# Patient Record
Sex: MALE | Race: WHITE | NOT HISPANIC OR LATINO
[De-identification: names, ages, dates, MRNs, and addresses within clinical notes are randomized per-mention and may not be internally consistent; named-entity substitution may affect disease eponyms.]

---

## 2017-08-01 ENCOUNTER — APPOINTMENT (OUTPATIENT)
Dept: SURGERY | Facility: CLINIC | Age: 82
End: 2017-08-01
Payer: MEDICARE

## 2017-08-01 VITALS
HEART RATE: 63 BPM | SYSTOLIC BLOOD PRESSURE: 140 MMHG | BODY MASS INDEX: 32.96 KG/M2 | WEIGHT: 210 LBS | DIASTOLIC BLOOD PRESSURE: 83 MMHG | HEIGHT: 67 IN | TEMPERATURE: 98.2 F

## 2017-08-01 DIAGNOSIS — Z87.19 PERSONAL HISTORY OF OTHER DISEASES OF THE DIGESTIVE SYSTEM: ICD-10-CM

## 2017-08-01 DIAGNOSIS — K80.10 CALCULUS OF GALLBLADDER WITH CHRONIC CHOLECYSTITIS W/OUT OBSTRUCTION: ICD-10-CM

## 2017-08-01 DIAGNOSIS — K43.2 INCISIONAL HERNIA W/OUT OBSTRUCTION OR GANGRENE: ICD-10-CM

## 2017-08-01 PROCEDURE — 99213 OFFICE O/P EST LOW 20 MIN: CPT

## 2018-07-10 ENCOUNTER — APPOINTMENT (OUTPATIENT)
Dept: SURGERY | Facility: CLINIC | Age: 83
End: 2018-07-10
Payer: MEDICARE

## 2018-07-10 VITALS
WEIGHT: 210 LBS | DIASTOLIC BLOOD PRESSURE: 78 MMHG | HEART RATE: 86 BPM | HEIGHT: 67 IN | SYSTOLIC BLOOD PRESSURE: 120 MMHG | BODY MASS INDEX: 32.96 KG/M2

## 2018-07-10 DIAGNOSIS — R10.31 RIGHT LOWER QUADRANT PAIN: ICD-10-CM

## 2018-07-10 PROCEDURE — 99214 OFFICE O/P EST MOD 30 MIN: CPT

## 2018-07-10 RX ORDER — TOBRAMYCIN AND DEXAMETHASONE 3; 1 MG/ML; MG/ML
0.3-0.1 SUSPENSION/ DROPS OPHTHALMIC
Qty: 5 | Refills: 0 | Status: COMPLETED | COMMUNITY
Start: 2018-03-09

## 2019-11-18 ENCOUNTER — INPATIENT (INPATIENT)
Facility: HOSPITAL | Age: 84
LOS: 5 days | Discharge: HOME | DRG: 85 | End: 2019-11-24
Attending: SURGERY | Admitting: SURGERY
Payer: MEDICARE

## 2019-11-18 ENCOUNTER — EMERGENCY (EMERGENCY)
Facility: HOSPITAL | Age: 84
LOS: 1 days | Discharge: ANOTHER HOSPITAL | End: 2019-11-18
Attending: EMERGENCY MEDICINE | Admitting: EMERGENCY MEDICINE
Payer: MEDICARE

## 2019-11-18 VITALS
SYSTOLIC BLOOD PRESSURE: 159 MMHG | RESPIRATION RATE: 16 BRPM | HEIGHT: 66 IN | WEIGHT: 210.1 LBS | DIASTOLIC BLOOD PRESSURE: 98 MMHG | HEART RATE: 97 BPM | TEMPERATURE: 98 F | OXYGEN SATURATION: 92 %

## 2019-11-18 VITALS
TEMPERATURE: 99 F | OXYGEN SATURATION: 98 % | HEART RATE: 89 BPM | SYSTOLIC BLOOD PRESSURE: 150 MMHG | RESPIRATION RATE: 18 BRPM | DIASTOLIC BLOOD PRESSURE: 70 MMHG

## 2019-11-18 LAB
ALBUMIN SERPL ELPH-MCNC: 3.5 G/DL — SIGNIFICANT CHANGE UP (ref 3.3–5)
ALP SERPL-CCNC: 64 U/L — SIGNIFICANT CHANGE UP (ref 40–120)
ALT FLD-CCNC: 12 U/L — SIGNIFICANT CHANGE UP (ref 12–78)
ANION GAP SERPL CALC-SCNC: 5 MMOL/L — SIGNIFICANT CHANGE UP (ref 5–17)
APTT BLD: 26.9 SEC — LOW (ref 28.5–37)
AST SERPL-CCNC: 18 U/L — SIGNIFICANT CHANGE UP (ref 15–37)
BILIRUB SERPL-MCNC: 0.5 MG/DL — SIGNIFICANT CHANGE UP (ref 0.2–1.2)
BUN SERPL-MCNC: 19 MG/DL — SIGNIFICANT CHANGE UP (ref 7–23)
CALCIUM SERPL-MCNC: 8.6 MG/DL — SIGNIFICANT CHANGE UP (ref 8.5–10.1)
CHLORIDE SERPL-SCNC: 109 MMOL/L — HIGH (ref 96–108)
CK SERPL-CCNC: 97 U/L — SIGNIFICANT CHANGE UP (ref 26–308)
CO2 SERPL-SCNC: 29 MMOL/L — SIGNIFICANT CHANGE UP (ref 22–31)
CREAT SERPL-MCNC: 1.1 MG/DL — SIGNIFICANT CHANGE UP (ref 0.5–1.3)
GLUCOSE SERPL-MCNC: 115 MG/DL — HIGH (ref 70–99)
HCT VFR BLD CALC: 42.3 % — SIGNIFICANT CHANGE UP (ref 39–50)
HGB BLD-MCNC: 13.7 G/DL — SIGNIFICANT CHANGE UP (ref 13–17)
INR BLD: 1.2 RATIO — HIGH (ref 0.88–1.16)
MCHC RBC-ENTMCNC: 28.4 PG — SIGNIFICANT CHANGE UP (ref 27–34)
MCHC RBC-ENTMCNC: 32.4 GM/DL — SIGNIFICANT CHANGE UP (ref 32–36)
MCV RBC AUTO: 87.6 FL — SIGNIFICANT CHANGE UP (ref 80–100)
NRBC # BLD: 0 /100 WBCS — SIGNIFICANT CHANGE UP (ref 0–0)
PLATELET # BLD AUTO: 100 K/UL — LOW (ref 150–400)
POTASSIUM SERPL-MCNC: 4.2 MMOL/L — SIGNIFICANT CHANGE UP (ref 3.5–5.3)
POTASSIUM SERPL-SCNC: 4.2 MMOL/L — SIGNIFICANT CHANGE UP (ref 3.5–5.3)
PROT SERPL-MCNC: 6.7 G/DL — SIGNIFICANT CHANGE UP (ref 6–8.3)
PROTHROM AB SERPL-ACNC: 13.6 SEC — HIGH (ref 10–12.9)
RBC # BLD: 4.83 M/UL — SIGNIFICANT CHANGE UP (ref 4.2–5.8)
RBC # FLD: 14.2 % — SIGNIFICANT CHANGE UP (ref 10.3–14.5)
SODIUM SERPL-SCNC: 143 MMOL/L — SIGNIFICANT CHANGE UP (ref 135–145)
TROPONIN I SERPL-MCNC: 0.8 NG/ML — HIGH (ref 0.01–0.04)
WBC # BLD: 5.82 K/UL — SIGNIFICANT CHANGE UP (ref 3.8–10.5)
WBC # FLD AUTO: 5.82 K/UL — SIGNIFICANT CHANGE UP (ref 3.8–10.5)

## 2019-11-18 PROCEDURE — 70450 CT HEAD/BRAIN W/O DYE: CPT | Mod: 26

## 2019-11-18 PROCEDURE — 99285 EMERGENCY DEPT VISIT HI MDM: CPT | Mod: 25

## 2019-11-18 PROCEDURE — 93010 ELECTROCARDIOGRAM REPORT: CPT

## 2019-11-18 PROCEDURE — 71250 CT THORAX DX C-: CPT | Mod: 26

## 2019-11-18 PROCEDURE — 86077 PHYS BLOOD BANK SERV XMATCH: CPT

## 2019-11-18 PROCEDURE — 70486 CT MAXILLOFACIAL W/O DYE: CPT | Mod: 26

## 2019-11-18 PROCEDURE — 72125 CT NECK SPINE W/O DYE: CPT | Mod: 26

## 2019-11-18 PROCEDURE — 99291 CRITICAL CARE FIRST HOUR: CPT

## 2019-11-18 RX ORDER — ONDANSETRON 8 MG/1
4 TABLET, FILM COATED ORAL ONCE
Refills: 0 | Status: COMPLETED | OUTPATIENT
Start: 2019-11-18 | End: 2019-11-18

## 2019-11-18 RX ORDER — HYDROMORPHONE HYDROCHLORIDE 2 MG/ML
0.5 INJECTION INTRAMUSCULAR; INTRAVENOUS; SUBCUTANEOUS ONCE
Refills: 0 | Status: DISCONTINUED | OUTPATIENT
Start: 2019-11-18 | End: 2019-11-18

## 2019-11-18 RX ORDER — MORPHINE SULFATE 50 MG/1
4 CAPSULE, EXTENDED RELEASE ORAL ONCE
Refills: 0 | Status: DISCONTINUED | OUTPATIENT
Start: 2019-11-18 | End: 2019-11-18

## 2019-11-18 RX ORDER — TETANUS TOXOID, REDUCED DIPHTHERIA TOXOID AND ACELLULAR PERTUSSIS VACCINE, ADSORBED 5; 2.5; 8; 8; 2.5 [IU]/.5ML; [IU]/.5ML; UG/.5ML; UG/.5ML; UG/.5ML
0.5 SUSPENSION INTRAMUSCULAR ONCE
Refills: 0 | Status: COMPLETED | OUTPATIENT
Start: 2019-11-18 | End: 2019-11-18

## 2019-11-18 RX ORDER — SODIUM CHLORIDE 9 MG/ML
3 INJECTION INTRAMUSCULAR; INTRAVENOUS; SUBCUTANEOUS ONCE
Refills: 0 | Status: COMPLETED | OUTPATIENT
Start: 2019-11-18 | End: 2019-11-18

## 2019-11-18 RX ADMIN — TETANUS TOXOID, REDUCED DIPHTHERIA TOXOID AND ACELLULAR PERTUSSIS VACCINE, ADSORBED 0.5 MILLILITER(S): 5; 2.5; 8; 8; 2.5 SUSPENSION INTRAMUSCULAR at 20:11

## 2019-11-18 RX ADMIN — ONDANSETRON 4 MILLIGRAM(S): 8 TABLET, FILM COATED ORAL at 20:10

## 2019-11-18 RX ADMIN — MORPHINE SULFATE 4 MILLIGRAM(S): 50 CAPSULE, EXTENDED RELEASE ORAL at 20:09

## 2019-11-18 RX ADMIN — MORPHINE SULFATE 4 MILLIGRAM(S): 50 CAPSULE, EXTENDED RELEASE ORAL at 21:35

## 2019-11-18 RX ADMIN — SODIUM CHLORIDE 3 MILLILITER(S): 9 INJECTION INTRAMUSCULAR; INTRAVENOUS; SUBCUTANEOUS at 20:06

## 2019-11-18 RX ADMIN — HYDROMORPHONE HYDROCHLORIDE 0.5 MILLIGRAM(S): 2 INJECTION INTRAMUSCULAR; INTRAVENOUS; SUBCUTANEOUS at 22:12

## 2019-11-18 NOTE — ED ADULT NURSE NOTE - OBJECTIVE STATEMENT
Pt comes from triage. Pt reports that he tripped on a curb outside and hit his head and right side of chest. Pt has dried blood to right forehead and right sided breast pain. Pt placed on telemetry monitor. Pt breathing easy, unlabored, no signs of distress, reports it hurts on the right side to take a deep breath. Pt denies LOC, denies blood thinners. Pt does not have any bruising noted to right chest. Pt reports he was lying on the ground for an estimated 30 minutes before a car stopped and a bystander helped him up.

## 2019-11-18 NOTE — ED PROVIDER NOTE - OBJECTIVE STATEMENT
Pt is a 87 yo male who presents to the ED with a cc of fall.  PMHx of HTN.  Pt reports that today while walking outside to get his mail he tripped on the curb, lost his balance, and fell to the ground striking the right side of his face and body.  Denies LOC.  Pt is on ASA but denies use of other blood thinners.  Pt report that he was experiencing right sided facial pain and right upper anterior chest pain.  Pain is worsened with movement, touch, and deep breathing.  He reports that he laid on the ground for approx 20 min and was unable to get up on his own.  He was finally helped up by someone driving by.  He reports continued chest pain and so he came to the ED for further work up.  Pt did not take anything for the pain.  Unsure of date of last Tetanus.  Denies HA, N/V, SOB, abd pain.  Denies neck or back pain.  Denies numbness or tingling in his ext, denies loss of bowel or bladder function. Pt is a 85 yo male who presents to the ED with a cc of fall.  PMHx of HTN.  Pt reports that today while walking outside to get his mail he tripped on the curb, lost his balance, and fell to the ground striking the right side of his face and body.  Denies LOC.  Pt is on ASA but denies use of other blood thinners.  Pt report that he was experiencing right sided facial pain and right upper anterior chest pain.  Pain is worsened with movement, touch, and deep breathing.  He reports that he laid on the ground for approx 20 min and was unable to get up on his own.  He was finally helped up by someone driving by.  He reports continued chest pain and so he came to the ED for further work up.  Pt did not take anything for the pain.  Unsure of date of last Tetanus.  Denies HA, visual changes, N/V, SOB, abd pain.  Denies neck or back pain.  Denies numbness or tingling in his ext, denies loss of bowel or bladder function.

## 2019-11-18 NOTE — ED PROVIDER NOTE - PROGRESS NOTE DETAILS
spoke with Dr. [place] regarding results of CT head/facial, chest.  Also discussed elevated troponin.  Will accept pt in transfer to the ED at [facility].  Requests no platelet transfusion at this time

## 2019-11-18 NOTE — ED PROVIDER NOTE - CARE PLAN
Principal Discharge DX:	Intraparenchymal hematoma of brain  Secondary Diagnosis:	Subdural hematoma  Secondary Diagnosis:	Rib contusion, right, initial encounter  Secondary Diagnosis:	Abrasions of multiple sites  Secondary Diagnosis:	Elevated troponin

## 2019-11-18 NOTE — ED ADULT NURSE NOTE - NSIMPLEMENTINTERV_GEN_ALL_ED
Implemented All Fall Risk Interventions:  [place] to call system. Call bell, personal items and telephone within reach. Instruct patient to call for assistance. Room bathroom lighting operational. Non-slip footwear when patient is off stretcher. Physically safe environment: no spills, clutter or unnecessary equipment. Stretcher in lowest position, wheels locked, appropriate side rails in place. Provide visual cue, wrist band, yellow gown, etc. Monitor gait and stability. Monitor for mental status changes and reorient to person, place, and time. Review medications for side effects contributing to fall risk. Reinforce activity limits and safety measures with patient and family.

## 2019-11-18 NOTE — ED ADULT NURSE REASSESSMENT NOTE - NS ED NURSE REASSESS COMMENT FT1
Ambulance arrives for pt, report given to EMS. All paperwork and belongings with EMS for transfer. Pt transferred to EMS stretcher without incident. Report given to [facility] RN Nataly.

## 2019-11-18 NOTE — ED PROVIDER NOTE - CLINICAL SUMMARY MEDICAL DECISION MAKING FREE TEXT BOX
Pt is a 87 yo male who presents to the ED with a cc of fall.  PMHx of HTN.  Pt reports that today while walking outside to get his mail he tripped on the curb, lost his balance, and fell to the ground striking the right side of his face and body.  Denies LOC.  Pt is on ASA but denies use of other blood thinners.  Pt report that he was experiencing right sided facial pain and right upper anterior chest pain.  Pain is worsened with movement, touch, and deep breathing.  He reports that he laid on the ground for approx 20 min and was unable to get up on his own.  He was finally helped up by someone driving by.  He reports continued chest pain and so he came to the ED for further work up.  Pt did not take anything for the pain.  Unsure of date of last Tetanus.  Denies HA, visual changes, N/V, SOB, abd pain.  Denies neck or back pain.  Denies numbness or tingling in his ext, denies loss of bowel or bladder function. Pt is a 85 yo male who presents to the ED with a cc of fall.  PMHx of HTN.  Pt reports that today while walking outside to get his mail he tripped on the curb, lost his balance, and fell to the ground striking the right side of his face and body.  Denies LOC.  Pt is on ASA but denies use of other blood thinners.  Pt report that he was experiencing right sided facial pain and right upper anterior chest pain.  Pain is worsened with movement, touch, and deep breathing.  He reports that he laid on the ground for approx 20 min and was unable to get up on his own.  He was finally helped up by someone driving by.  He reports continued chest pain and so he came to the ED for further work up.  Pt did not take anything for the pain.  Unsure of date of last Tetanus.  Denies HA, visual changes, N/V, SOB, abd pain.  Denies neck or back pain.  Denies numbness or tingling in his ext, denies loss of bowel or bladder function.  Concern for rib fracture vs ICH.  Will obtain CT head/neck/facial bones, CT chest.  Will obtain labs, medical for pain and update Tetanus.

## 2019-11-18 NOTE — ED PROVIDER NOTE - SECONDARY DIAGNOSIS.
Subdural hematoma Rib contusion, right, initial encounter Abrasions of multiple sites Elevated troponin

## 2019-11-18 NOTE — ED PROVIDER NOTE - PHYSICAL EXAMINATION
TTP to right superior and inferior orbital region TTP to right superior and inferior orbital region with overlying lateral abrasion   TTP to right maxillary region with mild swelling   EOMI with no pain on eye movement   TMs clear no septal hematoma   TTP to right superior/anterior chest wall with no overlying contusion   no midline C/T/L TTP no acute step offs or deformities noted   Bilateral abrasions noted to knees with contusion also noted to right knee FROM with no eileen TTP, no significant ligament instability noted   No TTP to bilateral pelvic region

## 2019-11-18 NOTE — ED ADULT TRIAGE NOTE - CHIEF COMPLAINT QUOTE
"I just fell and hit my head. I was outside on the ground for 20 minutes before somebody found me. My head really hurts and my chest is killing me"

## 2019-11-19 VITALS
RESPIRATION RATE: 18 BRPM | OXYGEN SATURATION: 98 % | DIASTOLIC BLOOD PRESSURE: 70 MMHG | SYSTOLIC BLOOD PRESSURE: 148 MMHG | TEMPERATURE: 98 F | HEART RATE: 81 BPM

## 2019-11-19 DIAGNOSIS — S06.5X9A TRAUMATIC SUBDURAL HEMORRHAGE WITH LOSS OF CONSCIOUSNESS OF UNSPECIFIED DURATION, INITIAL ENCOUNTER: ICD-10-CM

## 2019-11-19 LAB
ALBUMIN SERPL ELPH-MCNC: 3.7 G/DL — SIGNIFICANT CHANGE UP (ref 3.3–5.2)
ALP SERPL-CCNC: 90 U/L — SIGNIFICANT CHANGE UP (ref 40–120)
ALT FLD-CCNC: 15 U/L — SIGNIFICANT CHANGE UP
ANION GAP SERPL CALC-SCNC: 12 MMOL/L — SIGNIFICANT CHANGE UP (ref 5–17)
ANION GAP SERPL CALC-SCNC: 12 MMOL/L — SIGNIFICANT CHANGE UP (ref 5–17)
APTT BLD: 23.8 SEC — LOW (ref 27.5–36.3)
AST SERPL-CCNC: 47 U/L — HIGH
BASOPHILS # BLD AUTO: 0.02 K/UL — SIGNIFICANT CHANGE UP (ref 0–0.2)
BASOPHILS NFR BLD AUTO: 0.3 % — SIGNIFICANT CHANGE UP (ref 0–2)
BILIRUB SERPL-MCNC: 0.8 MG/DL — SIGNIFICANT CHANGE UP (ref 0.4–2)
BUN SERPL-MCNC: 17 MG/DL — SIGNIFICANT CHANGE UP (ref 8–20)
BUN SERPL-MCNC: 19 MG/DL — SIGNIFICANT CHANGE UP (ref 8–20)
CALCIUM SERPL-MCNC: 8.5 MG/DL — LOW (ref 8.6–10.2)
CALCIUM SERPL-MCNC: 8.8 MG/DL — SIGNIFICANT CHANGE UP (ref 8.6–10.2)
CHLORIDE SERPL-SCNC: 102 MMOL/L — SIGNIFICANT CHANGE UP (ref 98–107)
CHLORIDE SERPL-SCNC: 104 MMOL/L — SIGNIFICANT CHANGE UP (ref 98–107)
CK MB CFR SERPL CALC: 12.6 NG/ML — HIGH (ref 0–6.7)
CK SERPL-CCNC: 172 U/L — SIGNIFICANT CHANGE UP (ref 30–200)
CO2 SERPL-SCNC: 26 MMOL/L — SIGNIFICANT CHANGE UP (ref 22–29)
CO2 SERPL-SCNC: 27 MMOL/L — SIGNIFICANT CHANGE UP (ref 22–29)
CREAT SERPL-MCNC: 0.81 MG/DL — SIGNIFICANT CHANGE UP (ref 0.5–1.3)
CREAT SERPL-MCNC: 0.91 MG/DL — SIGNIFICANT CHANGE UP (ref 0.5–1.3)
EOSINOPHIL # BLD AUTO: 0.01 K/UL — SIGNIFICANT CHANGE UP (ref 0–0.5)
EOSINOPHIL NFR BLD AUTO: 0.2 % — SIGNIFICANT CHANGE UP (ref 0–6)
GLUCOSE SERPL-MCNC: 121 MG/DL — HIGH (ref 70–115)
GLUCOSE SERPL-MCNC: 128 MG/DL — HIGH (ref 70–115)
HCT VFR BLD CALC: 39.5 % — SIGNIFICANT CHANGE UP (ref 39–50)
HCT VFR BLD CALC: 40.9 % — SIGNIFICANT CHANGE UP (ref 39–50)
HGB BLD-MCNC: 12.5 G/DL — LOW (ref 13–17)
HGB BLD-MCNC: 13.4 G/DL — SIGNIFICANT CHANGE UP (ref 13–17)
IMM GRANULOCYTES NFR BLD AUTO: 0.5 % — SIGNIFICANT CHANGE UP (ref 0–1.5)
INR BLD: 1.19 RATIO — HIGH (ref 0.88–1.16)
LYMPHOCYTES # BLD AUTO: 0.98 K/UL — LOW (ref 1–3.3)
LYMPHOCYTES # BLD AUTO: 16.5 % — SIGNIFICANT CHANGE UP (ref 13–44)
MAGNESIUM SERPL-MCNC: 1.8 MG/DL — SIGNIFICANT CHANGE UP (ref 1.6–2.6)
MCHC RBC-ENTMCNC: 28.4 PG — SIGNIFICANT CHANGE UP (ref 27–34)
MCHC RBC-ENTMCNC: 29.3 PG — SIGNIFICANT CHANGE UP (ref 27–34)
MCHC RBC-ENTMCNC: 31.6 GM/DL — LOW (ref 32–36)
MCHC RBC-ENTMCNC: 32.8 GM/DL — SIGNIFICANT CHANGE UP (ref 32–36)
MCV RBC AUTO: 89.3 FL — SIGNIFICANT CHANGE UP (ref 80–100)
MCV RBC AUTO: 89.8 FL — SIGNIFICANT CHANGE UP (ref 80–100)
MONOCYTES # BLD AUTO: 0.83 K/UL — SIGNIFICANT CHANGE UP (ref 0–0.9)
MONOCYTES NFR BLD AUTO: 13.9 % — SIGNIFICANT CHANGE UP (ref 2–14)
NEUTROPHILS # BLD AUTO: 4.08 K/UL — SIGNIFICANT CHANGE UP (ref 1.8–7.4)
NEUTROPHILS NFR BLD AUTO: 68.6 % — SIGNIFICANT CHANGE UP (ref 43–77)
PHOSPHATE SERPL-MCNC: 3.3 MG/DL — SIGNIFICANT CHANGE UP (ref 2.4–4.7)
PLATELET # BLD AUTO: 106 K/UL — LOW (ref 150–400)
PLATELET # BLD AUTO: 98 K/UL — LOW (ref 150–400)
POTASSIUM SERPL-MCNC: 4.1 MMOL/L — SIGNIFICANT CHANGE UP (ref 3.5–5.3)
POTASSIUM SERPL-MCNC: 4.1 MMOL/L — SIGNIFICANT CHANGE UP (ref 3.5–5.3)
POTASSIUM SERPL-SCNC: 4.1 MMOL/L — SIGNIFICANT CHANGE UP (ref 3.5–5.3)
POTASSIUM SERPL-SCNC: 4.1 MMOL/L — SIGNIFICANT CHANGE UP (ref 3.5–5.3)
PROT SERPL-MCNC: 6.5 G/DL — LOW (ref 6.6–8.7)
PROTHROM AB SERPL-ACNC: 13.8 SEC — HIGH (ref 10–12.9)
RBC # BLD: 4.4 M/UL — SIGNIFICANT CHANGE UP (ref 4.2–5.8)
RBC # BLD: 4.58 M/UL — SIGNIFICANT CHANGE UP (ref 4.2–5.8)
RBC # FLD: 13.8 % — SIGNIFICANT CHANGE UP (ref 10.3–14.5)
RBC # FLD: 13.9 % — SIGNIFICANT CHANGE UP (ref 10.3–14.5)
SODIUM SERPL-SCNC: 141 MMOL/L — SIGNIFICANT CHANGE UP (ref 135–145)
SODIUM SERPL-SCNC: 142 MMOL/L — SIGNIFICANT CHANGE UP (ref 135–145)
TROPONIN T SERPL-MCNC: 0.58 NG/ML — HIGH (ref 0–0.06)
TROPONIN T SERPL-MCNC: 0.74 NG/ML — HIGH (ref 0–0.06)
WBC # BLD: 5.95 K/UL — SIGNIFICANT CHANGE UP (ref 3.8–10.5)
WBC # BLD: 6.88 K/UL — SIGNIFICANT CHANGE UP (ref 3.8–10.5)
WBC # FLD AUTO: 5.95 K/UL — SIGNIFICANT CHANGE UP (ref 3.8–10.5)
WBC # FLD AUTO: 6.88 K/UL — SIGNIFICANT CHANGE UP (ref 3.8–10.5)

## 2019-11-19 PROCEDURE — 70496 CT ANGIOGRAPHY HEAD: CPT | Mod: 26

## 2019-11-19 PROCEDURE — 72125 CT NECK SPINE W/O DYE: CPT

## 2019-11-19 PROCEDURE — 99232 SBSQ HOSP IP/OBS MODERATE 35: CPT

## 2019-11-19 PROCEDURE — 93005 ELECTROCARDIOGRAM TRACING: CPT

## 2019-11-19 PROCEDURE — 99233 SBSQ HOSP IP/OBS HIGH 50: CPT

## 2019-11-19 PROCEDURE — 36415 COLL VENOUS BLD VENIPUNCTURE: CPT

## 2019-11-19 PROCEDURE — 86880 COOMBS TEST DIRECT: CPT

## 2019-11-19 PROCEDURE — 85027 COMPLETE CBC AUTOMATED: CPT

## 2019-11-19 PROCEDURE — 86901 BLOOD TYPING SEROLOGIC RH(D): CPT

## 2019-11-19 PROCEDURE — 80053 COMPREHEN METABOLIC PANEL: CPT

## 2019-11-19 PROCEDURE — 70450 CT HEAD/BRAIN W/O DYE: CPT | Mod: 26,59

## 2019-11-19 PROCEDURE — 85610 PROTHROMBIN TIME: CPT

## 2019-11-19 PROCEDURE — 70450 CT HEAD/BRAIN W/O DYE: CPT

## 2019-11-19 PROCEDURE — 71250 CT THORAX DX C-: CPT

## 2019-11-19 PROCEDURE — 99222 1ST HOSP IP/OBS MODERATE 55: CPT

## 2019-11-19 PROCEDURE — 99223 1ST HOSP IP/OBS HIGH 75: CPT

## 2019-11-19 PROCEDURE — 99223 1ST HOSP IP/OBS HIGH 75: CPT | Mod: GC

## 2019-11-19 PROCEDURE — 86850 RBC ANTIBODY SCREEN: CPT

## 2019-11-19 PROCEDURE — 85730 THROMBOPLASTIN TIME PARTIAL: CPT

## 2019-11-19 PROCEDURE — 84484 ASSAY OF TROPONIN QUANT: CPT

## 2019-11-19 PROCEDURE — 90471 IMMUNIZATION ADMIN: CPT

## 2019-11-19 PROCEDURE — 96375 TX/PRO/DX INJ NEW DRUG ADDON: CPT

## 2019-11-19 PROCEDURE — 82550 ASSAY OF CK (CPK): CPT

## 2019-11-19 PROCEDURE — 96374 THER/PROPH/DIAG INJ IV PUSH: CPT

## 2019-11-19 PROCEDURE — 90715 TDAP VACCINE 7 YRS/> IM: CPT

## 2019-11-19 PROCEDURE — 86870 RBC ANTIBODY IDENTIFICATION: CPT

## 2019-11-19 PROCEDURE — 86900 BLOOD TYPING SEROLOGIC ABO: CPT

## 2019-11-19 PROCEDURE — 93010 ELECTROCARDIOGRAM REPORT: CPT

## 2019-11-19 PROCEDURE — 99285 EMERGENCY DEPT VISIT HI MDM: CPT | Mod: 25

## 2019-11-19 PROCEDURE — 70486 CT MAXILLOFACIAL W/O DYE: CPT

## 2019-11-19 RX ORDER — METOPROLOL TARTRATE 50 MG
25 TABLET ORAL DAILY
Refills: 0 | Status: DISCONTINUED | OUTPATIENT
Start: 2019-11-19 | End: 2019-11-19

## 2019-11-19 RX ORDER — METOPROLOL TARTRATE 50 MG
25 TABLET ORAL DAILY
Refills: 0 | Status: DISCONTINUED | OUTPATIENT
Start: 2019-11-19 | End: 2019-11-20

## 2019-11-19 RX ORDER — SENNA PLUS 8.6 MG/1
2 TABLET ORAL AT BEDTIME
Refills: 0 | Status: DISCONTINUED | OUTPATIENT
Start: 2019-11-19 | End: 2019-11-24

## 2019-11-19 RX ORDER — OXYCODONE HYDROCHLORIDE 5 MG/1
2.5 TABLET ORAL EVERY 6 HOURS
Refills: 0 | Status: DISCONTINUED | OUTPATIENT
Start: 2019-11-19 | End: 2019-11-24

## 2019-11-19 RX ORDER — SODIUM CHLORIDE 9 MG/ML
1000 INJECTION INTRAMUSCULAR; INTRAVENOUS; SUBCUTANEOUS
Refills: 0 | Status: DISCONTINUED | OUTPATIENT
Start: 2019-11-19 | End: 2019-11-19

## 2019-11-19 RX ORDER — FUROSEMIDE 40 MG
20 TABLET ORAL
Refills: 0 | Status: DISCONTINUED | OUTPATIENT
Start: 2019-11-19 | End: 2019-11-19

## 2019-11-19 RX ORDER — ENOXAPARIN SODIUM 100 MG/ML
40 INJECTION SUBCUTANEOUS DAILY
Refills: 0 | Status: DISCONTINUED | OUTPATIENT
Start: 2019-11-19 | End: 2019-11-24

## 2019-11-19 RX ORDER — OXYCODONE HYDROCHLORIDE 5 MG/1
5 TABLET ORAL EVERY 6 HOURS
Refills: 0 | Status: DISCONTINUED | OUTPATIENT
Start: 2019-11-19 | End: 2019-11-24

## 2019-11-19 RX ORDER — MAGNESIUM SULFATE 500 MG/ML
2 VIAL (ML) INJECTION ONCE
Refills: 0 | Status: COMPLETED | OUTPATIENT
Start: 2019-11-19 | End: 2019-11-19

## 2019-11-19 RX ORDER — FUROSEMIDE 40 MG
40 TABLET ORAL DAILY
Refills: 0 | Status: DISCONTINUED | OUTPATIENT
Start: 2019-11-19 | End: 2019-11-19

## 2019-11-19 RX ORDER — ONDANSETRON 8 MG/1
4 TABLET, FILM COATED ORAL EVERY 4 HOURS
Refills: 0 | Status: DISCONTINUED | OUTPATIENT
Start: 2019-11-19 | End: 2019-11-24

## 2019-11-19 RX ORDER — GABAPENTIN 400 MG/1
100 CAPSULE ORAL EVERY 8 HOURS
Refills: 0 | Status: DISCONTINUED | OUTPATIENT
Start: 2019-11-19 | End: 2019-11-24

## 2019-11-19 RX ORDER — POLYETHYLENE GLYCOL 3350 17 G/17G
17 POWDER, FOR SOLUTION ORAL DAILY
Refills: 0 | Status: DISCONTINUED | OUTPATIENT
Start: 2019-11-19 | End: 2019-11-24

## 2019-11-19 RX ORDER — CHLORHEXIDINE GLUCONATE 213 G/1000ML
1 SOLUTION TOPICAL DAILY
Refills: 0 | Status: DISCONTINUED | OUTPATIENT
Start: 2019-11-19 | End: 2019-11-22

## 2019-11-19 RX ORDER — LIDOCAINE 4 G/100G
1 CREAM TOPICAL EVERY 24 HOURS
Refills: 0 | Status: DISCONTINUED | OUTPATIENT
Start: 2019-11-19 | End: 2019-11-24

## 2019-11-19 RX ORDER — ACETAMINOPHEN 500 MG
650 TABLET ORAL EVERY 6 HOURS
Refills: 0 | Status: DISCONTINUED | OUTPATIENT
Start: 2019-11-19 | End: 2019-11-24

## 2019-11-19 RX ORDER — ATORVASTATIN CALCIUM 80 MG/1
10 TABLET, FILM COATED ORAL AT BEDTIME
Refills: 0 | Status: DISCONTINUED | OUTPATIENT
Start: 2019-11-19 | End: 2019-11-24

## 2019-11-19 RX ORDER — CELECOXIB 200 MG/1
200 CAPSULE ORAL
Refills: 0 | Status: DISCONTINUED | OUTPATIENT
Start: 2019-11-19 | End: 2019-11-24

## 2019-11-19 RX ORDER — PANTOPRAZOLE SODIUM 20 MG/1
40 TABLET, DELAYED RELEASE ORAL
Refills: 0 | Status: DISCONTINUED | OUTPATIENT
Start: 2019-11-19 | End: 2019-11-24

## 2019-11-19 RX ORDER — FUROSEMIDE 40 MG
20 TABLET ORAL
Refills: 0 | Status: DISCONTINUED | OUTPATIENT
Start: 2019-11-19 | End: 2019-11-24

## 2019-11-19 RX ADMIN — SENNA PLUS 2 TABLET(S): 8.6 TABLET ORAL at 21:44

## 2019-11-19 RX ADMIN — ENOXAPARIN SODIUM 40 MILLIGRAM(S): 100 INJECTION SUBCUTANEOUS at 21:45

## 2019-11-19 RX ADMIN — Medication 20 MILLIGRAM(S): at 14:33

## 2019-11-19 RX ADMIN — CHLORHEXIDINE GLUCONATE 1 APPLICATION(S): 213 SOLUTION TOPICAL at 13:03

## 2019-11-19 RX ADMIN — LIDOCAINE 1 PATCH: 4 CREAM TOPICAL at 13:02

## 2019-11-19 RX ADMIN — OXYCODONE HYDROCHLORIDE 5 MILLIGRAM(S): 5 TABLET ORAL at 08:37

## 2019-11-19 RX ADMIN — Medication 650 MILLIGRAM(S): at 04:07

## 2019-11-19 RX ADMIN — Medication 50 GRAM(S): at 07:41

## 2019-11-19 RX ADMIN — Medication 25 MILLIGRAM(S): at 23:07

## 2019-11-19 RX ADMIN — OXYCODONE HYDROCHLORIDE 5 MILLIGRAM(S): 5 TABLET ORAL at 09:56

## 2019-11-19 RX ADMIN — CELECOXIB 200 MILLIGRAM(S): 200 CAPSULE ORAL at 14:00

## 2019-11-19 RX ADMIN — Medication 650 MILLIGRAM(S): at 03:07

## 2019-11-19 RX ADMIN — LIDOCAINE 1 PATCH: 4 CREAM TOPICAL at 19:00

## 2019-11-19 RX ADMIN — SODIUM CHLORIDE 70 MILLILITER(S): 9 INJECTION INTRAMUSCULAR; INTRAVENOUS; SUBCUTANEOUS at 07:41

## 2019-11-19 RX ADMIN — PANTOPRAZOLE SODIUM 40 MILLIGRAM(S): 20 TABLET, DELAYED RELEASE ORAL at 21:44

## 2019-11-19 RX ADMIN — ATORVASTATIN CALCIUM 10 MILLIGRAM(S): 80 TABLET, FILM COATED ORAL at 21:44

## 2019-11-19 RX ADMIN — OXYCODONE HYDROCHLORIDE 5 MILLIGRAM(S): 5 TABLET ORAL at 17:19

## 2019-11-19 RX ADMIN — CELECOXIB 200 MILLIGRAM(S): 200 CAPSULE ORAL at 13:02

## 2019-11-19 RX ADMIN — SODIUM CHLORIDE 100 MILLILITER(S): 9 INJECTION INTRAMUSCULAR; INTRAVENOUS; SUBCUTANEOUS at 03:09

## 2019-11-19 RX ADMIN — GABAPENTIN 100 MILLIGRAM(S): 400 CAPSULE ORAL at 13:02

## 2019-11-19 RX ADMIN — GABAPENTIN 100 MILLIGRAM(S): 400 CAPSULE ORAL at 21:45

## 2019-11-19 RX ADMIN — OXYCODONE HYDROCHLORIDE 5 MILLIGRAM(S): 5 TABLET ORAL at 16:22

## 2019-11-19 NOTE — H&P ADULT - HISTORY OF PRESENT ILLNESS
ACUTE CARE SURGERY H&P    HPI: Patient was walking on the street earlier today, but states he did not lose consciousness but fell over on his right side; found to have a SDH, small IPH, and an elevated troponin of .798 at [place].    PAST MEDICAL HISTORY: PMH    PAST SURGICAL HISTORY: None    ALLERGIES: Denies     FAMILY HISTORY: Noncontributory    SOCIAL HISTORY: Denies tobacco, EtOH, illicit substance use.     HOME MEDICATIONS:    VITALS:  Vital Signs:  T(F): 98.3F  HR: 93  BP: 159/98  RR: 12  SpO2: 98% RA      GENERAL: Alert, well developed, in no acute distress.  MENTAL STATUS: AAOx3. Appropriate affect.  HEENT: PERRLA. EOMI. MMM.  Trachea midline. No lymph node swelling or tenderness.  RESPIRATORY: CTAB. No wheezing, rales or rhonchi.  CARDIOVASCULAR: RRR. No audible murmurs, rubs or gallops.   GASTROINTESTINAL: Abdomen soft, NT, ND, -R/-G.  No pulsatile mass, no flank tenderness or suprapubic tenderness. No hepatosplenomegaly.  NEUROLOGIC: Cranial nerves II-XII grossly intact. No focal neurological deficits. Moves all extremities spontaneously. Sensation intact bilaterallyINTEGUMENTARY: No overt rashes or lesions, petechia or purpura. Good turgor. No edema.  MUSCULOSKELETAL: No cyanosis or clubbing. No gross deformities.   LYMPHATIC: Palpation of neck reveals no swelling or tenderness of neck nodes. Palpation of groin reveals no swelling or tenderness of groin nodes.    LABS: pending, ordered    Lactate:   pending, ordered        IMAGING: PENDING 6 hour CT Head- initial CT H with IPH, SDH ACUTE CARE SURGERY H&P    Trauma B Code - Transfer from [place] for admission to the ICU under the care of the SICU team.    *Patient himself provides the history, is alert & oriented x4*    HPI: Patient was walking on the street earlier today, states he did not lose consciousness but fell over on his right side striking his head and his right chest wall on concrete. Laceration on right eyebrow repaired at [place]. At [facility] he was found to have a SDH, a small IPH, and an elevated troponin of .798. Here patient c/o slight HA on the right side and pain in his right chest wall.  Patient denies fevers/chills, denies lightheadedness/dizziness, denies nausea/vomiting, denies constipation/diarrhea.     A airway intact, speaking full sentences  B equal breath sounds bilaterally  C radial/DP/femoral pulses intact bilaterally   D GCS15 E4V5M6, moving all fours, no focal deficits, pupils R 3mm reactive/L 3mm reactive  E patient fully exposed, no gross deformities or bleeding, provided warm blankets    CXR no fracture or hemopneumothorax - at [place]    Initial Vital Signs:  T(F): 98.3F  HR: 93  BP: 159/98  RR: 12  SpO2: 98% RA    Secondary survey remarkable for R scalp hematoma, ecchymoses around right eye, right-sided chest wall tenderness    ROS: 10-system review is otherwise negative except HPI above.      PAST MEDICAL & SURGICAL HISTORY:    HTN    Open oracio, Ex lap for diverticulitis & ?colonic rx  L inguinal hernia repair     ALLERGIES: Allergy Status Unknown    HOME MEDICATIONS: *81 asa* - STATES HE WILL LOOK FOR HIS LIST OF MEDICATIONS for further but for now, he states baby ASA only    SOCIAL HISTORY: Denies tobacco, EtOH, illicit substance use. FORMER SMOKER - 2 PPD - quit 40 years ago and has not smoked since  CBD oil for aches and pains - did not help him whatsoever and no longer uses  Social drinker - 1-2 drinks per month only  --------------------------------------------------------------------------------------------    PHYSICAL EXAM:   General: AA WN male in NAD, sitting up in bed comfortably  Neuro: A+Ox4  HEENT: Head with TTP at right scalp w/ underlying hematoma; Right eye socket with surrounding ecchymoses, EOMI  Neck: Soft, supple  Cardio: RRR, nml S1/S2  Resp: Good effort, CTA b/l  Thorax: right-sided chest wall tenderness  Breast: No lesions/masses, no drainage  GI/Abd: Soft, NT/ND, no rebound/guarding, no masses palpated  Vascular: All 4 extremities warm.  Skin: Intact, no breakdown  Lymphatic/Nodes: No palpable lymphadenopathy  Pelvis: stable  Musculoskeletal: All 4 extremities moving spontaneously, no limitations, no spinal tenderness or stepoffs  --------------------------------------------------------------------------------------------    LABS PENDING HERE AT [facility] - Initial labs remarkable for elevated Troponin to 0.7.    --------------------------------------------------------------------------------------------  IMAGING  CT head: IPH SDH from [place] scans    ASSESSMENT: Patient is a 86y old male s/p MECHANICAL FALL - transferred from [place] after the fall when found to have SDH. 	    PLAN:   - Admit to SICU given IPH   - F/U repeat troponin and STAT labs  - f/u NSx - consulted in trauma bay - repeat CTH 6 hour ordered & pending now  - NPO  - IVF  - pain control PRN  - Repeat CT Head, CTA   - strict bedrest  - strict I/Os  - Patient seen/examined with attending Dr. SANTIAGO ACUTE CARE SURGERY H&P    Trauma B Code - Transfer from [place] for admission to the ICU under the care of the SICU team.    *Patient himself provides the history, is alert & oriented x4*    HPI: Patient was walking on the street earlier today, states he did not lose consciousness but fell over on his right side striking his head and his right chest wall on concrete. Laceration on right eyebrow repaired at [facility]. At [facility] he was found to have a SDH, a small IPH, and an elevated troponin of .798. Here patient c/o slight HA on the right side and pain in his right chest wall.  Patient denies fevers/chills, denies lightheadedness/dizziness, denies nausea/vomiting, denies constipation/diarrhea.     A airway intact, speaking full sentences  B equal breath sounds bilaterally  C radial/DP/femoral pulses intact bilaterally   D GCS15 E4V5M6, moving all fours, no focal deficits, pupils R 3mm reactive/L 3mm reactive  E patient fully exposed, no gross deformities or bleeding, provided warm blankets    CXR no fracture or hemopneumothorax - at [facility]    Initial Vital Signs:  T(F): 98.3F  HR: 93  BP: 159/98  RR: 12  SpO2: 98% RA    Secondary survey remarkable for R scalp hematoma, ecchymoses around right eye, right-sided chest wall tenderness    ROS: 10-system review is otherwise negative except HPI above.      PAST MEDICAL & SURGICAL HISTORY:    HTN    Open oracio, Ex lap for diverticulitis & ?colonic rx  L inguinal hernia repair     FH non contributory    ALLERGIES: Allergy Status Unknown    HOME MEDICATIONS: *81 asa* - STATES HE WILL LOOK FOR HIS LIST OF MEDICATIONS for further but for now, he states baby ASA only    SOCIAL HISTORY: Denies tobacco, EtOH, illicit substance use. FORMER SMOKER - 2 PPD - quit 40 years ago and has not smoked since  CBD oil for aches and pains - did not help him whatsoever and no longer uses  Social drinker - 1-2 drinks per month only  --------------------------------------------------------------------------------------------    PHYSICAL EXAM:   General: AA WN male in NAD, sitting up in bed comfortably  Neuro: A+Ox4  HEENT: Head with TTP at right scalp w/ underlying hematoma; Right eye socket with surrounding ecchymoses, EOMI  Neck: Soft, supple  Cardio: RRR, nml S1/S2  Resp: Good effort, CTA b/l  Thorax: right-sided chest wall tenderness  Breast: No lesions/masses, no drainage  GI/Abd: Soft, NT/ND, no rebound/guarding, no masses palpated  Vascular: All 4 extremities warm.  Skin: Intact, no breakdown  Lymphatic/Nodes: No palpable lymphadenopathy  Pelvis: stable  Musculoskeletal: All 4 extremities moving spontaneously, no limitations, no spinal tenderness or stepoffs  --------------------------------------------------------------------------------------------    LABS PENDING HERE AT [facility] - Initial labs remarkable for elevated Troponin to 0.7.    --------------------------------------------------------------------------------------------  IMAGING  CT head: IPH SDH from [facility] scans    ASSESSMENT: Patient is a 86y old male s/p MECHANICAL FALL - transferred from [facility] after the fall when found to have SDH. 	    PLAN:   - Admit to SICU given IPH   - F/U repeat troponin and STAT labs  - f/u NSx - consulted in trauma bay - repeat CTH 6 hour ordered & pending now  - NPO  - IVF  - pain control PRN  - Repeat CT Head, CTA   - strict bedrest  - strict I/Os  - Patient seen/examined with attending Dr. SANTIAGO

## 2019-11-19 NOTE — PROGRESS NOTE ADULT - SUBJECTIVE AND OBJECTIVE BOX
24 HOUR EVENTS: No acute events.  Patient remains neurologically intake.  Denies HA, nausea, vomiting, change in vision. Repeat head CT stable. Pending MRI Brain.     SUBJECTIVE/ROS:  [ x] A ten-point review of systems was otherwise negative except as noted.  [ ] Due to altered mental status/intubation, subjective information were not able to be obtained from the patient. History was obtained, to the extent possible, from review of the chart and collateral sources of information.      NEURO  RASS:     GCS:     CAM ICU:  Exam: see below   Meds: acetaminophen   Tablet .. 650 milliGRAM(s) Oral every 6 hours PRN Mild Pain (1 - 3)  celecoxib 200 milliGRAM(s) Oral two times a day  gabapentin 100 milliGRAM(s) Oral every 8 hours  ondansetron Injectable 4 milliGRAM(s) IV Push every 4 hours PRN Nausea and/or Vomiting  oxyCODONE    IR 2.5 milliGRAM(s) Oral every 6 hours PRN Moderate Pain (4 - 6)  oxyCODONE    IR 5 milliGRAM(s) Oral every 6 hours PRN Severe Pain (7 - 10)    [x] Adequacy of sedation and pain control has been assessed and adjusted      RESPIRATORY  RR: 23 (11-19-19 @ 22:00) (18 - 45)  SpO2: 98% (11-19-19 @ 22:00) (92% - 98%)  Wt(kg): --  Exam: unlabored, clear to auscultation bilaterally  Mechanical Ventilation:     [ ] Extubation Readiness Assessed  Meds:       CARDIOVASCULAR  HR: 63 (11-19-19 @ 22:00) (53 - 83)  BP: 92/53 (11-19-19 @ 22:00) (92/53 - 163/77)  BP(mean): 68 (11-19-19 @ 22:00) (68 - 110)  ABP: --  ABP(mean): --  Wt(kg): --  CVP(cm H2O): --  VBG - ( 19 Nov 2019 00:23 )  pH: x     /  pCO2: x     /  pO2: x     / HCO3: x     / Base Excess: x     /  SaO2: x      Lactate: 0.7                Exam: NSR   Cardiac Rhythm: RRR  Perfusion     [ x]Adequate   [ ]Inadequate  Mentation   [x ]Normal       [ ]Reduced  Extremities  [x ]Warm         [ ]Cool  Volume Status [ ]Hypervolemic [x ]Euvolemic [ ]Hypovolemic  Meds: furosemide    Tablet 20 milliGRAM(s) Oral <User Schedule>  metoprolol succinate ER 25 milliGRAM(s) Oral daily        GI/NUTRITION  Exam: soft, nttp, nd  Diet: Dysphagia diet   Meds: pantoprazole    Tablet 40 milliGRAM(s) Oral before breakfast  polyethylene glycol 3350 17 Gram(s) Oral daily PRN Constipation  senna 2 Tablet(s) Oral at bedtime      GENITOURINARY  I&O's Detail    11-18 @ 07:01  -  11-19 @ 07:00  --------------------------------------------------------  IN:    sodium chloride 0.9%: 470 mL  Total IN: 470 mL    OUT:    Voided: 250 mL  Total OUT: 250 mL    Total NET: 220 mL      11-19 @ 07:01  -  11-19 @ 23:04  --------------------------------------------------------  IN:    Oral Fluid: 200 mL    sodium chloride 0.9%: 70 mL    Solution: 50 mL  Total IN: 320 mL    OUT:    Voided: 580 mL  Total OUT: 580 mL    Total NET: -260 mL        Weight (kg): 95.254 (11-19 @ 02:30)  11-19    141  |  102  |  17.0  ----------------------------<  128<H>  4.1   |  27.0  |  0.81    Ca    8.5<L>      19 Nov 2019 05:26  Phos  3.3     11-19  Mg     1.8     11-19    TPro  6.5<L>  /  Alb  3.7  /  TBili  0.8  /  DBili  x   /  AST  47<H>  /  ALT  15  /  AlkPhos  90  11-19    [ ] Connors catheter, indication: N/A  Meds:     PHYSICAL EXAM:  GENERAL: NAD, pleasant elderly male  HEAD:  With abrasion to R eyebrow   EYES: Conjunctiva and sclera clear  NECK: Supple  STEVEN COMA SCORE: E- 4 V- 5 M- 6=15  MENTAL STATUS: AAO x3; Appropriately conversant without aphasia; following commands  CRANIAL NERVES: PERRL. EOMI without nystagmus. Facial sensation intact V1-3 distribution b/l. Face symmetric w/ normal eye closure and smile, tongue midline. Hearing grossly intact. Speech clear  MOTOR: strength 5/5 b/l upper and lower extremities  SENSATION: grossly intact to light touch all extremities   SPINE: -midline lumbar tenderness to palpation  CHEST/LUNG: Clear to auscultation bilaterally  HEART: Regular rate; reproducible chest wall tenderness of R side   ABDOMEN: Soft, nontender, nondistended  SKIN: Warm, dry; no rashes or lesions    HEMATOLOGIC  Meds: enoxaparin Injectable 40 milliGRAM(s) SubCutaneous daily    [x] VTE Prophylaxis                        12.5   5.95  )-----------( 98       ( 19 Nov 2019 05:26 )             39.5     PT/INR - ( 19 Nov 2019 00:22 )   PT: 13.8 sec;   INR: 1.19 ratio         PTT - ( 19 Nov 2019 00:22 )  PTT:23.8 sec  Transfusion     [ ] PRBC   [ ] Platelets   [ ] FFP   [ ] Cryoprecipitate      INFECTIOUS DISEASES  T(C): 36.8 (11-19-19 @ 16:00), Max: 36.9 (11-19-19 @ 12:27)  Wt(kg): --  WBC Count: 5.95 K/uL (11-19 @ 05:26)  WBC Count: 6.88 K/uL (11-19 @ 00:22)    Recent Cultures:    Meds:       ENDOCRINE  Capillary Blood Glucose    Meds: atorvastatin 10 milliGRAM(s) Oral at bedtime        ACCESS DEVICES:  [ ] Peripheral IV  [ ] Central Venous Line	[ ] R	[ ] L	[ ] IJ	[ ] Fem	[ ] SC	Placed:   [ ] Arterial Line		[ ] R	[ ] L	[ ] Fem	[ ] Rad	[ ] Ax	Placed:   [ ] PICC:					[ ] Mediport  [ ] Urinary Catheter, Date Placed:   [ ] Necessity of urinary, arterial, and venous catheters discussed    OTHER MEDICATIONS:  chlorhexidine 2% Cloths 1 Application(s) Topical daily  lidocaine   Patch 1 Patch Transdermal every 24 hours      CODE STATUS:     IMAGING:

## 2019-11-19 NOTE — SWALLOW BEDSIDE ASSESSMENT ADULT - SWALLOW EVAL: DIAGNOSIS
Oral & pharyngeal stage of swallow clinically unremarkable with no overt s/s penetration/aspiration & no c/o globus sensation

## 2019-11-19 NOTE — PROGRESS NOTE ADULT - ASSESSMENT
A/p: 85 yo M on ASA, presented s/p mechanical fall with mild TBI - small R SDH and R frontal IPH- neuro intact, stable on repeat imaging.  CTA negative for vascular malformations.  Etiology - amyloid vs traumatic vs underlying lesion  Clinically and radiographically stable. CTA negative for vascular malformations.    Neuro: R SDH, R frontal intraparenchymal hem- remains neuro intact, repeat imaging stable, cont q2 hr neuro checks. MRI w/ w/o contrast to evaluate for possible etiology    Card: Cont hemodynamic monitoring, CAD w/ possible NSTEMI vs demand ischemia, hold aspirin at this time.  Adequate pain regimen for chest wall tenderness    Pulm: Avoid hypoxia, pulmonary toileting    GI: Dysphagia 2     : Voiding, replete lytes    Endo: No issues    Hem/DVT: SCDs, started dvt ppx, rec >100,000 plt and INR< 1.4     ID: None    Tubes/Lines: PIV    Dispo: High risk for neurologic complication continue neuro monitoring

## 2019-11-19 NOTE — PROGRESS NOTE ADULT - SUBJECTIVE AND OBJECTIVE BOX
85 yo M s/p mechanical fall (tripped, no LOC, remembers event, no preceding Sx reported).  GCS 15 on admission.  CTh with R small SDH + R frontal IPH.  TOday c/o R chest pain, reproducible, since the fall; denies HA, N/V, no motor weakness.    LABS: reviewed.  Recent imaging studies were reviewed.  MEDICATIONS: reviewed.    EXAMINATION:  General:  calm, cooperative.  HEENT:  R paraorbital ecchymosis, EOMI, face - ?slight R facial, PERRLA.  Neuro:  awake, alert, oriented x 3, follows commands, moves all extremities - 5/5, sensation intact.  Cards:  S1S2 present  Respiratory:  no respiratory distress  Abdomen:  soft  Extremities:  no edema  Skin:  warm/dry

## 2019-11-19 NOTE — CONSULT NOTE ADULT - ASSESSMENT
85 y/o male PMH HTN, hernia, AS, CAD (6/2019 [facility] RCA 80%) transfer from [facility] s/p trip and fall, SDH, small IPH, elevated troponin. Follows with Dr Edmond pena, recent ECHO EF 55-60%, Cath with CAD, no intervention, mod AS- non surgical at this time. admits to some chest pressure while at rest, self limiting, non radiating; also admits to fatigue with exertion.    CAD  - cath 6/19 RCA 80%- no intervention, report in chart  - asa as per neurosx  - restart lasix 20mg PO every other day, 1st dose today  - monitor K- replete as necessary  - encourage incentive spirometer   - on metoprolol 25mg daily, restart with holding parameters  - elevated troponin related to head injury, stress response, no intervention at this time.   - EKG- non ischemic   - restart statin     Moderate AS  - follows with Dr. Ledy taveras   - no interventions at this time  - TTE 11/6/19- EF 55-60%, normal LV systolic function     Thank you for allowing us to participate. Please call with any questions. 85 y/o male PMH HTN, hernia, AS, CAD (6/2019 [facility] RCA 80%) transfer from [facility] s/p trip and fall, SDH, small IPH, elevated troponin. Follows with Dr Edmond pena, recent ECHO EF 55-60%, Cath with CAD, no intervention, mod AS- non surgical at this time. admits to some chest pressure while at rest, self limiting, non radiating; also admits to fatigue with exertion.    CAD-   - cath 6/19 RCA 80%- no intervention, report in chart  - asa as per neurosx  - restart lasix 20mg PO every other day, 1st dose today  - monitor K- replete as necessary  - encourage incentive spirometer   - on metoprolol 25mg daily, restart with holding parameters  - elevated troponin related to head injury, stress response, no intervention at this time.   - EKG- non ischemic   - restart statin     Moderate AS  - follows with Dr. Ledy taveras   - no interventions at this time  - TTE 11/6/19- EF 55-60%, normal LV systolic function     Thank you for allowing us to participate. Please call with any questions.

## 2019-11-19 NOTE — SWALLOW BEDSIDE ASSESSMENT ADULT - SLP GENERAL OBSERVATIONS
Pt received & seen seated upright in bed, +awake/alert, +oriented, +O2 NC, +granddaughter present, 0/10 pain

## 2019-11-19 NOTE — PROGRESS NOTE ADULT - SUBJECTIVE AND OBJECTIVE BOX
INTERVAL HPI/OVERNIGHT EVENTS/SUBJECTIVE:  Admitted after mechanical fall with ICH.  C/O moderate R lateral chest pain, worse with movement and respirations.      ICU Vital Signs Last 24 Hrs  T(C): 36.7 (19 Nov 2019 08:00), Max: 37 (18 Nov 2019 22:36)  T(F): 98.1 (19 Nov 2019 08:00), Max: 98.6 (18 Nov 2019 22:36)  HR: 68 (19 Nov 2019 11:00) (53 - 97)  BP: 124/58 (19 Nov 2019 11:00) (115/56 - 163/77)  BP(mean): 83 (19 Nov 2019 11:00) (80 - 110)  ABP: --  ABP(mean): --  RR: 45 (19 Nov 2019 11:00) (16 - 45)  SpO2: 98% (19 Nov 2019 11:00) (92% - 98%)      I&O's Detail    18 Nov 2019 07:01  -  19 Nov 2019 07:00  --------------------------------------------------------  IN:    sodium chloride 0.9%: 470 mL  Total IN: 470 mL    OUT:    Voided: 250 mL  Total OUT: 250 mL    Total NET: 220 mL      19 Nov 2019 07:01  -  19 Nov 2019 11:42  --------------------------------------------------------  IN:    sodium chloride 0.9%: 70 mL    Solution: 50 mL  Total IN: 120 mL    OUT:    Voided: 230 mL  Total OUT: 230 mL    Total NET: -110 mL                MEDICATIONS  (STANDING):  celecoxib 200 milliGRAM(s) Oral two times a day  chlorhexidine 2% Cloths 1 Application(s) Topical daily  enoxaparin Injectable 40 milliGRAM(s) SubCutaneous daily  furosemide    Tablet 20 milliGRAM(s) Oral <User Schedule>  gabapentin 100 milliGRAM(s) Oral every 8 hours  lidocaine   Patch 1 Patch Transdermal every 24 hours  senna 2 Tablet(s) Oral at bedtime    MEDICATIONS  (PRN):  acetaminophen   Tablet .. 650 milliGRAM(s) Oral every 6 hours PRN Mild Pain (1 - 3)  ondansetron Injectable 4 milliGRAM(s) IV Push every 4 hours PRN Nausea and/or Vomiting  oxyCODONE    IR 2.5 milliGRAM(s) Oral every 6 hours PRN Moderate Pain (4 - 6)  oxyCODONE    IR 5 milliGRAM(s) Oral every 6 hours PRN Severe Pain (7 - 10)  polyethylene glycol 3350 17 Gram(s) Oral daily PRN Constipation          PHYSICAL EXAM:    Gen: NAD    Neurological: AAOx4, non focal    ENMT: MMM, R periorbital contusion.      Neck: trachea midline    Pulmonary: Non labored resp, right chest wall tender to palpation, worst over lateral ribs 6-9     Cardiovascular: RRR    Gastrointestinal: Soft    Skin: Normal tone, dry            LABS:  CBC Full  -  ( 19 Nov 2019 05:26 )  WBC Count : 5.95 K/uL  RBC Count : 4.40 M/uL  Hemoglobin : 12.5 g/dL  Hematocrit : 39.5 %  Platelet Count - Automated : 98 K/uL  Mean Cell Volume : 89.8 fl  Mean Cell Hemoglobin : 28.4 pg  Mean Cell Hemoglobin Concentration : 31.6 gm/dL  Auto Neutrophil # : 4.08 K/uL  Auto Lymphocyte # : 0.98 K/uL  Auto Monocyte # : 0.83 K/uL  Auto Eosinophil # : 0.01 K/uL  Auto Basophil # : 0.02 K/uL  Auto Neutrophil % : 68.6 %  Auto Lymphocyte % : 16.5 %  Auto Monocyte % : 13.9 %  Auto Eosinophil % : 0.2 %  Auto Basophil % : 0.3 %    11-19    141  |  102  |  17.0  ----------------------------<  128<H>  4.1   |  27.0  |  0.81    Ca    8.5<L>      19 Nov 2019 05:26  Phos  3.3     11-19  Mg     1.8     11-19    TPro  6.5<L>  /  Alb  3.7  /  TBili  0.8  /  DBili  x   /  AST  47<H>  /  ALT  15  /  AlkPhos  90  11-19    PT/INR - ( 19 Nov 2019 00:22 )   PT: 13.8 sec;   INR: 1.19 ratio         PTT - ( 19 Nov 2019 00:22 )  PTT:23.8 sec    RECENT CULTURES:      LIVER FUNCTIONS - ( 19 Nov 2019 00:22 )  Alb: 3.7 g/dL / Pro: 6.5 g/dL / ALK PHOS: 90 U/L / ALT: 15 U/L / AST: 47 U/L / GGT: x           CARDIAC MARKERS ( 19 Nov 2019 05:26 )  x     / 0.58 ng/mL / 172 U/L / x     / 12.6 ng/mL  CARDIAC MARKERS ( 19 Nov 2019 00:22 )  x     / 0.74 ng/mL / x     / x     / x      CARDIAC MARKERS ( 18 Nov 2019 20:10 )  .798 ng/mL / x     / 97 U/L / x     / x          CAPILLARY BLOOD GLUCOSE      RADIOLOGY & ADDITIONAL STUDIES:  Reviewed Images from OSH and CT head x2 here since admission.  Small stable traumatic SDH on all images.  moderate sized R frontal lobe hemorrhagic contusion with mild surrounding vasogenic edema.  Stable through images.  R pleural thickening posterior lateral thorax.  no rib fx identified.      ASSESSMENT/PLAN:  86yMale presenting with:  Trip and fall leading to traumatic cerebral hemorrhagic contusion with edema, traumatic SDH, no LOC.  NSTEMI.  Occult rib fx not identified on imaging.      Neuro:  Stable exam and imaging to this point.  Still some concern for delayed hemorrhage and contusion expansion as well as edema expansion.  Cont to monitor in ICU.  Will decrease neuro checks to q2 at bedtime to help prevent delirium.  Maintain high normal Na+ levels.  140-145.      HEENT: R periorbital contusion.  Symptomatic therapy as needed.  Ice to area.       CV: NSTEMI.  Poor candidate for antiplatelet agents and anticoagulants at this time.  Repeat ECG this AM.  Trend trop.  Echo.  Cards consult.       Pulm: Clinical rib fx, indirect CT evidence of fx.  splinting.  Pulm hygiene.  Improve pain control, multimodal therapy.      GI/Nutrition:  Diet as tolerated.      Proph: If exam remains stable will start chem DVT ppx tonight. SCD's for now.      Dispo: Cont to monitor in SICU.  PT/OT/PMR.

## 2019-11-19 NOTE — ED PROVIDER NOTE - CARDIAC, MLM
Normal rate, regular rhythm.  Heart sounds S1, S2.  No murmurs, rubs or gallops. Mi Normal rate, regular rhythm.  Heart sounds S1, S2.  No murmurs, rubs or gallops.

## 2019-11-19 NOTE — PROGRESS NOTE ADULT - ASSESSMENT
Assessment:  85 yo M on ASA, presented s/p mechanical fall with mild TBI - small R SDH and R frontal IPH, ICH score 1. Etiology - amyloid vs traumatic vs underlying lesion  Clinically and radiographically stable. CTA negative for vascular malformations.    Plan:  -please, continue neurochecks q2 hr in ICU settings  -pain control - avoid oversedation  -would consider MRI w/wo contrast to complete ICH w/up (r/o underlying lesion)  -please, keep Plt > 100,000 and INR < 1.4    Patient was critically ill and at high risk of death or neurologic complications due to re-bleeding, brain swelling/ compression/ herniation, cardiorespiratory failure.

## 2019-11-19 NOTE — CONSULT NOTE ADULT - ATTENDING COMMENTS
Patient seen and examined and cased discussed with resident. Agree with recommendations. Patient was independent prior to admission, seems anxious to go home, awaiting PT/OT evaluations
Patient seen and examined by me.  I have discussed my recommendation with the PA which are outlined above.  In the absense of any symptoms suggestive of angina no further cardiac workup is warranted.  Patient is advised to f/u with his Cardiologist Dr Pruitt after discharge  Will sign off

## 2019-11-19 NOTE — SWALLOW BEDSIDE ASSESSMENT ADULT - SWALLOW EVAL: RECOMMENDED FEEDING/EATING TECHNIQUES
maintain upright posture during/after eating for 30 mins/small sips/bites/crush medication (when feasible)/position upright (90 degrees)/alternate food with liquid

## 2019-11-19 NOTE — PROGRESS NOTE ADULT - ASSESSMENT
NOTE IS INCOMPLETE!!!! A/P: 87 y/o male with a right frontal SAH A/P: 85 y/o male with a right sided SDH/IPH. Neuro exam stable.   Discussed with Dr. Rodriguez  - SBP < 160   - continue q1h neuro checks   - pain control - avoid oversedation  - MRI w/wo contrast  - lovenox tonight for DVT prophylaxis 	  - continue plan per primary team   - NS will continue to follow

## 2019-11-19 NOTE — SWALLOW BEDSIDE ASSESSMENT ADULT - ASR SWALLOW ASPIRATION MONITOR
position upright (90Y)/cough/fever/change of breathing pattern/gurgly voice/oral hygiene/pneumonia/throat clearing/upper respiratory infection

## 2019-11-19 NOTE — CONSULT NOTE ADULT - SUBJECTIVE AND OBJECTIVE BOX
cc: Patient is a 86y old  Male who presents  Transfer from [facility] for SDH, IPH, s/p trip and fall.   Source: Patient himself good source    HPI: Patient is a 86y old  Male who presents  Transfer from [facility] for SDH, IPH, s/p trip and fall. Patient has clear recall of tripping on curb, no LOC,  no dizziness or balance difficulty prior to fall. Fell hitting his right chest and face.   Currently only complaint is right sided chest pain exacerbated with inspiration and movement. Denies any change to motor or sensory, no change vision ,hearing or speech, no dizziness or balance change, no nausea or vomiting, Noted to have elevated troponin at [facility].         PAST MEDICAL:  cardiac  htn  arthritis    SURGICAL HISTORY:  cholecystectomy, r inguinal herniorrhaphy, colon resection for diverticular disease, tonsillectomy    SOCIAL HISTORY:  + social  EtOH, +  tobacco 1 1/2 ppd x's 30 yrs, quit 40 yrs ago,  - drugs    FAMILY HISTORY: father: stomach CA,  mother:  CA ? type      ROS:  CONSTITUTIONAL: No fever, weight loss, or fatigue  EYES: No eye pain, visual disturbances, or discharge  ENMT:  No difficulty hearing, tinnitus, vertigo; No sinus or throat pain  NECK: No pain or stiffness  RESPIRATORY: No cough, wheezing, chills or hemoptysis; No shortness of breath  CARDIOVASCULAR: No chest pain, palpitations, dizziness, or leg swelling  GASTROINTESTINAL: No abdominal or epigastric pain. No nausea, vomiting, or hematemesis; No diarrhea or constipation. No melena or hematochezia.  GENITOURINARY: No dysuria, frequency, hematuria, or incontinence  NEUROLOGICAL: No headaches, memory loss, loss of strength, numbness, or tremors  SKIN: No itching, burning, rashes, or lesions   LYMPH NODES: No enlarged glands  ENDOCRINE: No heat or cold intolerance; No hair loss  MUSCULOSKELETAL: + joint pain and swelling; No muscle, +low back pain intermittent,  + extremity pain intermittent   PSYCHIATRIC: No depression, anxiety, mood swings, or difficulty sleeping  HEME/LYMPH: No easy bruising, or bleeding gums  ALLERGY AND IMMUNOLOGIC: No hives or eczema      MEDICATIONS  (HOME): ASPIRIN 81 MG DQ LAST DOSE THIS AM, BP MED? CARDIAC MED?      Allergies: NKA        VITAL SIGNS:  T(F): 98.3F  HR: 93  BP: 159/98  RR: 12  SpO2: 98% RA        PHYSICAL EXAM:  GENERAL: NAD, well-groomed, well-developed  HEAD: Right temporal and lateral orbit and lid ecchymosis and edema,  brow with s/p glue repaired small lac <1cm, Normocephalic  EYES: EOMI, PERRLA, conjunctiva and sclera clear  ENMT: Moist mucous membranes, Good dentition, intact.   NECK: Supple, No JVD  NERVOUS SYSTEM:  Alert & Oriented X3, Good concentration;  Per eomi, cranial nerves 2-12 intact,  gross visual acuity and fields intact,   Motor Strength 5/5 B/L upper and lower extremities;  sensory intact all  no pronator drift,  fine motor and coordination intact BLUE, some difficulty with heel/martinez BLLE, ( pt states arthritis)  SPINE: No C/T/L/S spine tenderness  CHEST/LUNG: + right anterior 3-4 th rib tenderness, sternal palpation elicits right chest pain, Clear bilaterally; No rales, rhonchi, wheezing, or rubs  HEART: Regular rate  ABDOMEN: Soft, Nontender, Nondistended; Bowel sounds present  EXTREMITIES:  2+ Peripheral Pulses radial and DP, No clubbing, cyanosis, or edema  LYMPH: No lymphadenopathy noted  SKIN: No rashes or lesions      RADIOLOGY & ADDITIONAL STUDIES:                          13.4   6.88  )-----------( x        ( 19 Nov 2019 00:22 )             40.9     11-19    142  |  104  |  19.0  ----------------------------<  121<H>  4.1   |  26.0  |  0.91    Ca    8.8      19 Nov 2019 00:22    TPro  6.5<L>  /  Alb  3.7  /  TBili  0.8  /  DBili  x   /  AST  47<H>  /  ALT  15  /  AlkPhos  90  11-19    PT/INR - ( 19 Nov 2019 00:22 )   PT: 13.8 sec;   INR: 1.19 ratio         PTT - ( 19 Nov 2019 00:22 )  PTT:23.8 sec

## 2019-11-19 NOTE — ED ADULT NURSE NOTE - CHIEF COMPLAINT QUOTE
BIBA from [place] c/o fall from standing having head and active chest pain. Patient fopund to have subdural hematoma. Code Trauma B activated with code team at bedside.

## 2019-11-19 NOTE — ED PROVIDER NOTE - CHPI ED SYMPTOMS NEG
no vomiting/no syncope/no nausea/no shortness of breath/no back pain/no cough/no chest pain/no dizziness/no fever

## 2019-11-19 NOTE — CONSULT NOTE ADULT - REASON FOR ADMISSION
Transfer for SDH, IPH, elevated troponin

## 2019-11-19 NOTE — SWALLOW BEDSIDE ASSESSMENT ADULT - COMMENTS
As per MD note:   "Patient is a 86y old  Male who presents  Transfer from [facility] for SDH, IPH, s/p trip and fall."

## 2019-11-19 NOTE — PROGRESS NOTE ADULT - SUBJECTIVE AND OBJECTIVE BOX
HPI: 87 y/o male with a Right SDH, R parenchymal ICH s/p trip and fall. [facility] from this AM stable, parenchymal ICH stable, SDH volume stable to slightly smaller. Neuro exam is intact.     A airway intact, speaking full sentences  B equal breath sounds bilaterally  C radial/DP/femoral pulses intact bilaterally   D GCS15 [id number], moving all fours, no focal deficits, pupils R 3mm reactive/L 3mm reactive  E patient fully exposed, no gross deformities or bleeding, provided warm blankets    CXR no fracture or hemopneumothorax - at [facility]    Initial Vital Signs:  T(F): 98.3F  HR: 93  BP: 159/98  RR: 12  SpO2: 98% RA    Secondary survey remarkable for R scalp hematoma, ecchymoses around right eye, right-sided chest wall tenderness    ROS: 10-system review is otherwise negative except HPI above.      PAST MEDICAL & SURGICAL HISTORY:    HTN    Open oracio, Ex lap for diverticulitis & ?colonic rx  L inguinal hernia repair     FH non contributory    ALLERGIES: Allergy Status Unknown    HOME MEDICATIONS: *81 asa* - STATES HE WILL LOOK FOR HIS LIST OF MEDICATIONS for further but for now, he states baby ASA only    SOCIAL HISTORY: Denies tobacco, EtOH, illicit substance use. FORMER SMOKER - 2 PPD - quit 40 years ago and has not smoked since  CBD oil for aches and pains - did not help him whatsoever and no longer uses  Social drinker - 1-2 drinks per month only  --------------------------------------------------------------------------------------------    PHYSICAL EXAM:   General: AA WN male in NAD, sitting up in bed comfortably  Neuro: A+Ox4  HEENT: Head with TTP at right scalp w/ underlying hematoma; Right eye socket with surrounding ecchymoses, EOMI  Neck: Soft, supple  Cardio: RRR, nml S1/S2  Resp: Good effort, CTA b/l  Thorax: right-sided chest wall tenderness  Breast: No lesions/masses, no drainage  GI/Abd: Soft, NT/ND, no rebound/guarding, no masses palpated  Vascular: All 4 extremities warm.  Skin: Intact, no breakdown  Lymphatic/Nodes: No palpable lymphadenopathy  Pelvis: stable  Musculoskeletal: All 4 extremities moving spontaneously, no limitations, no spinal tenderness or stepoffs  --------------------------------------------------------------------------------------------    LABS PENDING HERE AT [facility] - Initial labs remarkable for elevated Troponin to 0.7.    --------------------------------------------------------------------------------------------  IMAGING  CT head: H SDH from [facility] scans    ASSESSMENT: Patient is a 86y old male s/p MECHANICAL FALL - transferred from [facility] after the fall when found to have SDH. 	    PLAN:   - Admit to SICU given IPH   - F/U repeat troponin and STAT labs  - f/u NSx - consulted in trauma bay - repeat CTH 6 hour ordered & pending now  - NPO  - IVF  - pain control PRN  - Repeat CT Head, CTA   - strict bedrest  - strict I/Os  - Patient seen/examined with attending Dr. SANTIAGO (19 Nov 2019 00:18)      INTERVAL HPI/OVERNIGHT EVENTS:  86y Male s/p __ seen lying comfortably in bed. Tolerating diet. Passing gas/BM. Voiding. Connors in with __ clear urine. Denies headache, weakness, numbness, n/v/d, fevers, chills, chest pain, SOB.     Vital Signs Last 24 Hrs  T(C): 36.7 (19 Nov 2019 08:00), Max: 37 (18 Nov 2019 22:36)  T(F): 98.1 (19 Nov 2019 08:00), Max: 98.6 (18 Nov 2019 22:36)  HR: 68 (19 Nov 2019 11:00) (53 - 97)  BP: 124/58 (19 Nov 2019 11:00) (115/56 - 163/77)  BP(mean): 83 (19 Nov 2019 11:00) (80 - 110)  RR: 45 (19 Nov 2019 11:00) (16 - 45)  SpO2: 98% (19 Nov 2019 11:00) (92% - 98%)    PHYSICAL EXAM:  GENERAL: NAD, well-groomed, well-developed  HEAD:  Atraumatic, normocephalic  DRAINS:   WOUND: Dressing clean dry intact  STEVEN COMA SCORE: E- V- M- =       E: 4= opens eyes spontaneously 3= to voice 2= to noxious 1= no opening       V: 5= oriented 4= confused 3= inappropriate words 2= incomprehensible sounds 1= nonverbal 1T= intubated       M: 6= follows commands 5= localizes 4= withdraws 3= flexor posturing 2= extensor posturing 1= no movement  MENTAL STATUS: AAO x3; Awake/Comatose; Opens eyes spontaneously/to voice/to light touch/to noxious stimuli; Appropriately conversant without aphasia/Nonverbal; following simple commands/mimicking/not following commands  CRANIAL NERVES: Visual acuity normal for age, visual fields full to confrontation, PERRL. EOMI without nystagmus. Facial sensation intact V1-3 distribution b/l. Face symmetric w/ normal eye closure and smile, tongue midline. Hearing grossly intact. Speech clear. Head turning and shoulder shrug intact.   REFLEXES: Doll's eyes positive. Blinks to threat. Gag reflex intact. No pronator drift; DTRs 2+ intact and symmetric; negative Loera's b/l; negative clonus b/l  MOTOR: strength 5/5 b/l upper and lower extremities  Uppers     Delt     Bicep     Tricep     HG  R                5/5       5/5         5/5         5/5  L                5/5       5/5         5/5         5/5  Lowers      HF     KF      KE     PF     DF     EHL  R             5/5     5/5     5/5    5/5   5/5    5/5  L              5/5    5/5      5/5    5/5   5/5    5/5  SENSATION: grossly intact to light touch all extremities  COORDINATION: Gait intact; rapid alternating movements intact; heel to shin intact; no upper extremity dysmetria  CHEST/LUNG: Clear to auscultation bilaterally; no rales, rhonchi, wheezing, or rubs  HEART: +S1/+S2; Regular rate and rhythm; no murmurs, rubs, or gallops  ABDOMEN: Soft, nontender, nondistended; bowel sounds present all four quadrants  EXTREMITIES:  2+ peripheral pulses, no clubbing, cyanosis, or edema  SKIN: Warm, dry; no rashes or lesions    LABS:                        12.5   5.95  )-----------( 98       ( 19 Nov 2019 05:26 )             39.5     11-19    141  |  102  |  17.0  ----------------------------<  128<H>  4.1   |  27.0  |  0.81    Ca    8.5<L>      19 Nov 2019 05:26  Phos  3.3     11-19  Mg     1.8     11-19    TPro  6.5<L>  /  Alb  3.7  /  TBili  0.8  /  DBili  x   /  AST  47<H>  /  ALT  15  /  AlkPhos  90  11-19    PT/INR - ( 19 Nov 2019 00:22 )   PT: 13.8 sec;   INR: 1.19 ratio         PTT - ( 19 Nov 2019 00:22 )  PTT:23.8 sec      11-18 @ 07:01 - 11-19 @ 07:00  --------------------------------------------------------  IN: 470 mL / OUT: 250 mL / NET: 220 mL    11-19 @ 07:01 - 11-19 @ 11:56  --------------------------------------------------------  IN: 120 mL / OUT: 230 mL / NET: -110 mL        RADIOLOGY & ADDITIONAL TESTS: HPI: 87 y/o male with a Right SDH, R parenchymal ICH s/p trip and fall. Pt admitted to SICU. No acute events overnight. Repeat CTH from this AM stable, parenchymal ICH stable, SDH volume stable to slightly smaller. Patient doing well. Neuro exam is intact.     Vital Signs Last 24 Hrs  T(C): 36.7 (19 Nov 2019 08:00), Max: 37 (18 Nov 2019 22:36)  T(F): 98.1 (19 Nov 2019 08:00), Max: 98.6 (18 Nov 2019 22:36)  HR: 68 (19 Nov 2019 11:00) (53 - 97)  BP: 124/58 (19 Nov 2019 11:00) (115/56 - 163/77)  BP(mean): 83 (19 Nov 2019 11:00) (80 - 110)  RR: 45 (19 Nov 2019 11:00) (16 - 45)  SpO2: 98% (19 Nov 2019 11:00) (92% - 98%)    PHYSICAL EXAM:    GENERAL: NAD, well-groomed, well-developed  HEAD:  Ecchymosis on the lateral aspect of right eye,  normocephalic  STEVEN COMA SCORE: GCS 15  MENTAL STATUS: AAO x3, awake, Opens eyes spontaneously, Appropriately conversant without aphasia, following simple commands  CRANIAL NERVES:  EOMI without nystagmus. Facial sensation intact V1-3 distribution b/l. Face symmetric w/ normal eye closure and smile, tongue midline.   MOTOR: strength 5/5 b/l upper and lower extremities  SENSATION: grossly intact to light touch all extremities        LABS:                        12.5   5.95  )-----------( 98       ( 19 Nov 2019 05:26 )             39.5     11-19    141  |  102  |  17.0  ----------------------------<  128<H>  4.1   |  27.0  |  0.81    Ca    8.5<L>      19 Nov 2019 05:26  Phos  3.3     11-19  Mg     1.8     11-19    TPro  6.5<L>  /  Alb  3.7  /  TBili  0.8  /  DBili  x   /  AST  47<H>  /  ALT  15  /  AlkPhos  90  11-19    PT/INR - ( 19 Nov 2019 00:22 )   PT: 13.8 sec;   INR: 1.19 ratio         PTT - ( 19 Nov 2019 00:22 )  PTT:23.8 sec      11-18 @ 07:01  -  11-19 @ 07:00  --------------------------------------------------------  IN: 470 mL / OUT: 250 mL / NET: 220 mL    11-19 @ 07:01  -  11-19 @ 11:56  --------------------------------------------------------  IN: 120 mL / OUT: 230 mL / NET: -110 mL        RADIOLOGY & ADDITIONAL TESTS:     CT Head No Cont (11.19.19 @ 06:49)     FINDINGS:   Brain: Unchanged right frontal parenchymal hemorrhage with adjacent edema   and   mild overlying sulcal effacement. Right frontotemporal convexity subdural   hematoma has redistributed, but is similar in overall volume. No new   intracranial hemorrhage. Diffuse cortical volume loss. Chronic   microangiopathic   changes.   Midline shift: No midline shift.   Ventricles: No hydrocephalus.   Bones/joints: Unremarkable. No acute fracture.   Sinuses: Fluid in the right maxillary sinus. Possible right maxillary  sinus   cyst or polyp.   Mastoid air cells: No mastoid effusion.   Soft tissues: Unchanged right periorbital soft tissue injury.   Vasculature: Atherosclerosis.     IMPRESSION:   1. Unchanged right frontal parenchymal hemorrhage with associated edema   and   mild overlying sulcal effacement.   2. Redistribution of right frontotemporal convexity subdural hematoma,   which is   similar in overall volume.   3. Nonacute/incidental findings above. HPI: 85 y/o male with a Right SDH, R parenchymal ICH s/p trip and fall. Pt admitted to SICU. No acute events overnight. Repeat CTH from this AM stable, parenchymal ICH stable, SDH volume stable to slightly smaller. Patient doing well. Neuro exam is intact/stable.     Vital Signs Last 24 Hrs  T(C): 36.7 (19 Nov 2019 08:00), Max: 37 (18 Nov 2019 22:36)  T(F): 98.1 (19 Nov 2019 08:00), Max: 98.6 (18 Nov 2019 22:36)  HR: 68 (19 Nov 2019 11:00) (53 - 97)  BP: 124/58 (19 Nov 2019 11:00) (115/56 - 163/77)  BP(mean): 83 (19 Nov 2019 11:00) (80 - 110)  RR: 45 (19 Nov 2019 11:00) (16 - 45)  SpO2: 98% (19 Nov 2019 11:00) (92% - 98%)    PHYSICAL EXAM:    GENERAL: NAD, well-groomed, well-developed  HEAD:  Ecchymosis on the lateral aspect of right eye,  normocephalic  STEVEN COMA SCORE: GCS 15  MENTAL STATUS: AAO x3, awake, Opens eyes spontaneously, Appropriately conversant without aphasia, following simple commands  CRANIAL NERVES:  EOMI without nystagmus. Facial sensation intact V1-3 distribution b/l. Face symmetric w/ normal eye closure and smile, tongue midline.   MOTOR: strength 5/5 b/l upper and lower extremities  SENSATION: grossly intact to light touch all extremities        LABS:                        12.5   5.95  )-----------( 98       ( 19 Nov 2019 05:26 )             39.5     11-19    141  |  102  |  17.0  ----------------------------<  128<H>  4.1   |  27.0  |  0.81    Ca    8.5<L>      19 Nov 2019 05:26  Phos  3.3     11-19  Mg     1.8     11-19    TPro  6.5<L>  /  Alb  3.7  /  TBili  0.8  /  DBili  x   /  AST  47<H>  /  ALT  15  /  AlkPhos  90  11-19    PT/INR - ( 19 Nov 2019 00:22 )   PT: 13.8 sec;   INR: 1.19 ratio         PTT - ( 19 Nov 2019 00:22 )  PTT:23.8 sec      11-18 @ 07:01  -  11-19 @ 07:00  --------------------------------------------------------  IN: 470 mL / OUT: 250 mL / NET: 220 mL    11-19 @ 07:01  -  11-19 @ 11:56  --------------------------------------------------------  IN: 120 mL / OUT: 230 mL / NET: -110 mL        RADIOLOGY & ADDITIONAL TESTS:     CT Head No Cont (11.19.19 @ 06:49)     FINDINGS:   Brain: Unchanged right frontal parenchymal hemorrhage with adjacent edema   and   mild overlying sulcal effacement. Right frontotemporal convexity subdural   hematoma has redistributed, but is similar in overall volume. No new   intracranial hemorrhage. Diffuse cortical volume loss. Chronic   microangiopathic   changes.   Midline shift: No midline shift.   Ventricles: No hydrocephalus.   Bones/joints: Unremarkable. No acute fracture.   Sinuses: Fluid in the right maxillary sinus. Possible right maxillary  sinus   cyst or polyp.   Mastoid air cells: No mastoid effusion.   Soft tissues: Unchanged right periorbital soft tissue injury.   Vasculature: Atherosclerosis.     IMPRESSION:   1. Unchanged right frontal parenchymal hemorrhage with associated edema   and   mild overlying sulcal effacement.   2. Redistribution of right frontotemporal convexity subdural hematoma,   which is   similar in overall volume.   3. Nonacute/incidental findings above.

## 2019-11-19 NOTE — ED ADULT TRIAGE NOTE - CHIEF COMPLAINT QUOTE
BIBA from [place] c/o fall from standing having head and active chest pain. Patient fopund to have subdural hematoma. Code Trauma B activated with code team at bedside.

## 2019-11-19 NOTE — ED PROVIDER NOTE - OBJECTIVE STATEMENT
87 y/o M pt with significant PMHx of presents to the ED c/o a subdural hemorrhagic bleed s/p a mechanical fall in the street. Currently pt is having right sided CP. Trauma B Initiated. Transfer from [facility]. No further complaints at this time. 85 y/o M pt with no significant PMHx presents to the ED c/o a subdural hemorrhagic bleed s/p a mechanical fall in the street. Currently pt is having right sided CP. Trauma B Initiated. Transfer from [facility]. No further complaints at this time.

## 2019-11-19 NOTE — ED PROVIDER NOTE - TEMPLATE, MLM
Detail Level: Detailed Quality 110: Preventive Care And Screening: Influenza Immunization: Influenza Immunization Administered during Influenza season Cardiac Quality 474: Zoster Vaccination Status: Shingrix Vaccination Administered or Previously Received Quality 111:Pneumonia Vaccination Status For Older Adults: Pneumococcal Vaccination Previously Received

## 2019-11-19 NOTE — CONSULT NOTE ADULT - ASSESSMENT
IMP:  S/P MECHANICAL FALL  NO LOC    TRANS FROM [facility]  SDH AND PARENCHYMAL HEMORRHAGE    RIGHT TEMPORAL, LATERAL ORBIT, AND LIDS WITH  ECCHYMOSIS AND EDEMA    NEURO INTACT ALL    C/O RIGHT CHEST PAIN WITH MOVEMENT, + TENDERNESS 3-4 ICS ANTERIOR.  ELEVATED TROPONIN REPORTED  CT HEAD, CTA BRAIN PENDING READ    PLAN:   ICU  Q1H NEURO CKS  HOB UP 35 DEGREES  NPO  CT HEAD IN AM OR STAT FOR ANY NEURO STATUS CHANGE.   SBP<140  HOLD ANTI PLATELETS, NO ANTICOAGS, NO SEDATION OR NARCOTICS

## 2019-11-19 NOTE — H&P ADULT - ATTENDING COMMENTS
Patient seen and examined on arrival.  Transferred form OSH, fall IPN and SHD  on exam non focal, GCS 15, LEI  Repeat CT CT with mildly larger in size.  Appreciate neurosurgery input.  To SICU for neuro checks,  repeat CT in 4-6 hrs

## 2019-11-19 NOTE — SWALLOW BEDSIDE ASSESSMENT ADULT - SLP PERTINENT HISTORY OF CURRENT PROBLEM
Pt reports ~ 4 year h/o dysphagia with "choking episodes" at times, & c/o food "getting to lower throat" with subsequent vomitting, including a "lot of phlegm". Pt reports hx of prior FEES which was unremarkable (unable to recall when test was done, however, not within past 6 months). Pt reports having been seen by ENT & GI prior to admission, with no clinical findings  (as per pt report)

## 2019-11-19 NOTE — CONSULT NOTE ADULT - SUBJECTIVE AND OBJECTIVE BOX
[place] [facility]                                                               Office:  [address] [facility], [place] [address]                                                              Telephone: [phone]. Fax:[phone]                                                                        CARDIOLOGY CONSULTATION NOTE                                                                                             Consult requested by:    Reason for Consultation:   History obtained by: Patient and medical record   obtained: No    Chief complaint:    Patient is a 86y old  Male who presents with a chief complaint of Transfer for SDH, IPH, elevated troponin (19 Nov 2019 01:07)        HPI:  ACUTE CARE SURGERY H&P    Trauma B Code - Transfer from [place] for admission to the ICU under the care of the SICU team.    *Patient himself provides the history, is alert & oriented x4*    HPI: Patient was walking on the street earlier today, states he did not lose consciousness but fell over on his right side striking his head and his right chest wall on concrete. Laceration on right eyebrow repaired at [place]. At [facility] he was found to have a SDH, a small IPH, and an elevated troponin of .798. Here patient c/o slight HA on the right side and pain in his right chest wall.  Patient denies fevers/chills, denies lightheadedness/dizziness, denies nausea/vomiting, denies constipation/diarrhea.     A airway intact, speaking full sentences  B equal breath sounds bilaterally  C radial/DP/femoral pulses intact bilaterally   D GCS15 E4V5M6, moving all fours, no focal deficits, pupils R 3mm reactive/L 3mm reactive  E patient fully exposed, no gross deformities or bleeding, provided warm blankets    CXR no fracture or hemopneumothorax - at [place]    Initial Vital Signs:  T(F): 98.3F  HR: 93  BP: 159/98  RR: 12  SpO2: 98% RA    Secondary survey remarkable for R scalp hematoma, ecchymoses around right eye, right-sided chest wall tenderness    ROS: 10-system review is otherwise negative except HPI above.      PAST MEDICAL & SURGICAL HISTORY:    HTN    Open oracio, Ex lap for diverticulitis & ?colonic rx  L inguinal hernia repair       REVIEW OF SYMPTOMS:     CONSTITUTIONAL: No fever, weight loss, or fatigue  ENMT:  No difficulty hearing, tinnitus, vertigo; No sinus or throat pain  NECK: No pain or stiffness  CARDIOVASCULAR: + right sided chest pain,  No dyspnea, syncope, palpitations, dizziness, Orthopnea, Paroxsymal nocturnal dyspnea  RESPIRATORY: No Dyspnea on exertion, Shortness of breath, cough, wheezing  : No dysuria, no hematuria   GI: No dark color stool, no melena, no diarrhea, no constipation, no abdominal pain   NEURO: No headache, no dizziness, no slurred speech   MUSCULOSKELETAL: No joint pain or swelling; No muscle, back, or extremity pain  PSYCH: No agitation, no anxiety.    ALL OTHER REVIEW OF SYSTEMS ARE NEGATIVE.      PREVIOUS DIAGNOSTIC TESTING  ECHO FINDINGS:      STRESS FINDINGS:      CATHETERIZATION FINDINGS:         ALLERGIES: Allergies  Allergy Status Unknown  Intolerances        PAST MEDICAL HISTORY      PAST SURGICAL HISTORY      FAMILY HISTORY:      SOCIAL HISTORY:  FORMER SMOKER - 2 PPD - quit 40 years ago and has not smoked since  CBD oil for aches and pains - did not help him whatsoever and no longer uses  Social drinker - 1-2 drinks per month only      CURRENT MEDICATIONS:    gabapentin  chlorhexidine 2% Cloths  enoxaparin Injectable        HOME MEDICATIONS:      Vital Signs Last 24 Hrs  T(C): 36.7 (19 Nov 2019 08:00), Max: 36.8 (19 Nov 2019 01:41)  T(F): 98.1 (19 Nov 2019 08:00), Max: 98.2 (19 Nov 2019 01:41)  HR: 57 (19 Nov 2019 10:00) (53 - 83)  BP: 116/56 (19 Nov 2019 10:00) (115/56 - 163/77)  BP(mean): 80 (19 Nov 2019 10:00) (80 - 110)  RR: 28 (19 Nov 2019 10:00) (18 - 28)  SpO2: 97% (19 Nov 2019 10:00) (93% - 98%)      PHYSICAL EXAM:  Constitutional: Comfortable . No acute distress.   HEENT: normocephalic , neck is supple . no JVD.   CNS: A&Ox3. No focal deficits. EOMI.   Lymph Nodes: Cervical : Not palpable.  Respiratory: RLL crackles   Cardiovascular: S1S2 RRR. 2/6 systolic murmur/No rubs or gallop.  Gastrointestinal: Soft non-tender and non distended . +Bowel sounds.   Extremities: No edema.   Psychiatric: Calm . no agitation.  Skin: ecchymosis R side face, orbital edema     Intake and output:   11-18 @ 07:01  -  11-19 @ 07:00  --------------------------------------------------------  IN: 470 mL / OUT: 250 mL / NET: 220 mL    11-19 @ 07:01  -  11-19 @ 10:18  --------------------------------------------------------  IN: 120 mL / OUT: 0 mL / NET: 120 mL        LABS:                        12.5   5.95  )-----------( 98       ( 19 Nov 2019 05:26 )             39.5     11-19    141  |  102  |  17.0  ----------------------------<  128<H>  4.1   |  27.0  |  0.81    Ca    8.5<L>      19 Nov 2019 05:26  Phos  3.3     11-19  Mg     1.8     11-19    TPro  6.5<L>  /  Alb  3.7  /  TBili  0.8  /  DBili  x   /  AST  47<H>  /  ALT  15  /  AlkPhos  90  11-19    CARDIAC MARKERS ( 19 Nov 2019 05:26 )  x     / 0.58 ng/mL / 172 U/L / x     / 12.6 ng/mL  CARDIAC MARKERS ( 19 Nov 2019 00:22 )  x     / 0.74 ng/mL / x     / x     / x          PT/INR - ( 19 Nov 2019 00:22 )   PT: 13.8 sec;   INR: 1.19 ratio     PTT - ( 19 Nov 2019 00:22 )  PTT:23.8 sec      INTERPRETATION OF TELEMETRY: SR, APCs  ECG: SR, NSST-T abnl     RADIOLOGY & ADDITIONAL STUDIES:    X-ray:  reviewed by me.   CT scan:   < from: CT Head No Cont (11.19.19 @ 06:49) >  IMPRESSION:  Small acute intraparenchymal hemorrhage in the right frontal lobe and   right subdural hematoma as described above.      < end of copied text >    MRI: [place] [facility]                                                               Office:  [address] [place] [address]                                                              Telephone: [phone]. Fax:[phone]                                                                        CARDIOLOGY CONSULTATION NOTE                                                                                             Consult requested by:  Dr. mccartney  Reason for Consultation:  elevated troponin   History obtained by: Patient and medical record   obtained: No    Chief complaint:    Patient is a 86y old  Male who presents with a chief complaint of Transfer for SDH, IPH, elevated troponin (19 Nov 2019 01:07)      HPI: 87 y/o male PMH HTN, hernia, AS, CAD (6/2019 [facility] RCA 80%) transfer from [facility] s/p trip and fall, SDH, small IPH, elevated troponin.         REVIEW OF SYMPTOMS:     CONSTITUTIONAL: No fever, weight loss, or fatigue  ENMT:  No difficulty hearing, tinnitus, vertigo; No sinus or throat pain  NECK: No pain or stiffness  CARDIOVASCULAR: + right sided chest pain,  No dyspnea, syncope, palpitations, dizziness, Orthopnea, Paroxsymal nocturnal dyspnea  RESPIRATORY: No Dyspnea on exertion, Shortness of breath, cough, wheezing  : No dysuria, no hematuria   GI: No dark color stool, no melena, no diarrhea, no constipation, no abdominal pain   NEURO: No headache, no dizziness, no slurred speech   MUSCULOSKELETAL: No joint pain or swelling; No muscle, back, or extremity pain  PSYCH: No agitation, no anxiety.    ALL OTHER REVIEW OF SYSTEMS ARE NEGATIVE.      PREVIOUS DIAGNOSTIC TESTING  ECHO FINDINGS:      STRESS FINDINGS:      CATHETERIZATION FINDINGS:         ALLERGIES: Allergies  Allergy Status Unknown  Intolerances        PAST MEDICAL HISTORY  HTN    PAST SURGICAL HISTORY  Open oracio, Ex lap for diverticulitis & ?colonic rx  L inguinal hernia repair     FAMILY HISTORY:      SOCIAL HISTORY:  FORMER SMOKER - 2 PPD - quit 40 years ago and has not smoked since  CBD oil for aches and pains - did not help him whatsoever and no longer uses  Social drinker - 1-2 drinks per month only      CURRENT MEDICATIONS:    gabapentin  chlorhexidine 2% Cloths  enoxaparin Injectable        HOME MEDICATIONS:      Vital Signs Last 24 Hrs  T(C): 36.7 (19 Nov 2019 08:00), Max: 36.8 (19 Nov 2019 01:41)  T(F): 98.1 (19 Nov 2019 08:00), Max: 98.2 (19 Nov 2019 01:41)  HR: 57 (19 Nov 2019 10:00) (53 - 83)  BP: 116/56 (19 Nov 2019 10:00) (115/56 - 163/77)  BP(mean): 80 (19 Nov 2019 10:00) (80 - 110)  RR: 28 (19 Nov 2019 10:00) (18 - 28)  SpO2: 97% (19 Nov 2019 10:00) (93% - 98%)      PHYSICAL EXAM:  Constitutional: Comfortable . No acute distress.   HEENT: normocephalic , neck is supple . no JVD.   CNS: A&Ox3. No focal deficits. EOMI.   Lymph Nodes: Cervical : Not palpable.  Respiratory: RLL crackles   Cardiovascular: S1S2 RRR. 2/6 systolic murmur/No rubs or gallop.  Gastrointestinal: Soft non-tender and non distended . +Bowel sounds.   Extremities: No edema.   Psychiatric: Calm . no agitation.  Skin: ecchymosis R side face, orbital edema     Intake and output:   11-18 @ 07:01  -  11-19 @ 07:00  --------------------------------------------------------  IN: 470 mL / OUT: 250 mL / NET: 220 mL    11-19 @ 07:01  -  11-19 @ 10:18  --------------------------------------------------------  IN: 120 mL / OUT: 0 mL / NET: 120 mL        LABS:                        12.5   5.95  )-----------( 98       ( 19 Nov 2019 05:26 )             39.5     11-19    141  |  102  |  17.0  ----------------------------<  128<H>  4.1   |  27.0  |  0.81    Ca    8.5<L>      19 Nov 2019 05:26  Phos  3.3     11-19  Mg     1.8     11-19    TPro  6.5<L>  /  Alb  3.7  /  TBili  0.8  /  DBili  x   /  AST  47<H>  /  ALT  15  /  AlkPhos  90  11-19    CARDIAC MARKERS ( 19 Nov 2019 05:26 )  x     / 0.58 ng/mL / 172 U/L / x     / 12.6 ng/mL  CARDIAC MARKERS ( 19 Nov 2019 00:22 )  x     / 0.74 ng/mL / x     / x     / x          PT/INR - ( 19 Nov 2019 00:22 )   PT: 13.8 sec;   INR: 1.19 ratio     PTT - ( 19 Nov 2019 00:22 )  PTT:23.8 sec      INTERPRETATION OF TELEMETRY: SR, APCs  ECG: SR, NSST-T abnl     RADIOLOGY & ADDITIONAL STUDIES:    X-ray:  reviewed by me.   CT scan:   < from: CT Head No Cont (11.19.19 @ 06:49) >  IMPRESSION:  Small acute intraparenchymal hemorrhage in the right frontal lobe and   right subdural hematoma as described above.      < end of copied text >    MRI: [place] [facility]                                                               Office:  [address] [place] [address]                                                              Telephone: [phone]. Fax:[phone]                                                                        CARDIOLOGY CONSULTATION NOTE                                                                                             Consult requested by:  Dr. mccartney  Reason for Consultation:  elevated troponin   History obtained by: Patient and medical record   obtained: No    Chief complaint:    Patient is a 86y old  Male who presents with a chief complaint of Transfer for SDH, IPH, elevated troponin (19 Nov 2019 01:07)      HPI: 87 y/o male PMH HTN, hernia, AS, CAD (6/2019 [facility] RCA 80%) transfer from [facility] s/p trip and fall, SDH, small IPH, elevated troponin. Follows with Dr Edmond pena, recent ECHO EF 55-60%, Cath with CAD, no intervention, mod AS- non surgical at this time. admits to some chest pressure while at rest, self limiting, non radiating; also admits to fatigue with exertion. denies shortness of breath, dyspnea, orthopnea, lightheaded , dizziness, syncope.         REVIEW OF SYMPTOMS:     CONSTITUTIONAL: No fever, weight loss, or fatigue  ENMT:  No difficulty hearing, tinnitus, vertigo; No sinus or throat pain  NECK: No pain or stiffness  CARDIOVASCULAR: + right sided chest pain,  No dyspnea, syncope, palpitations, dizziness, Orthopnea, Paroxsymal nocturnal dyspnea  RESPIRATORY: No Dyspnea on exertion, Shortness of breath, cough, wheezing  : No dysuria, no hematuria   GI: No dark color stool, no melena, no diarrhea, no constipation, no abdominal pain   NEURO: No headache, no dizziness, no slurred speech   MUSCULOSKELETAL: No joint pain or swelling; No muscle, back, or extremity pain  PSYCH: No agitation, no anxiety.    ALL OTHER REVIEW OF SYSTEMS ARE NEGATIVE.      PREVIOUS DIAGNOSTIC TESTING  ECHO FINDINGS:      STRESS FINDINGS:      CATHETERIZATION FINDINGS:         ALLERGIES: Allergies  Allergy Status Unknown  Intolerances        PAST MEDICAL HISTORY  HTN    PAST SURGICAL HISTORY  Open oracio, Ex lap for diverticulitis & ?colonic rx  L inguinal hernia repair     FAMILY HISTORY:      SOCIAL HISTORY:  FORMER SMOKER - 2 PPD - quit 40 years ago and has not smoked since  CBD oil for aches and pains - did not help him whatsoever and no longer uses  Social drinker - 1-2 drinks per month only      CURRENT MEDICATIONS:    gabapentin  chlorhexidine 2% Cloths  enoxaparin Injectable        HOME MEDICATIONS:      Vital Signs Last 24 Hrs  T(C): 36.7 (19 Nov 2019 08:00), Max: 36.8 (19 Nov 2019 01:41)  T(F): 98.1 (19 Nov 2019 08:00), Max: 98.2 (19 Nov 2019 01:41)  HR: 57 (19 Nov 2019 10:00) (53 - 83)  BP: 116/56 (19 Nov 2019 10:00) (115/56 - 163/77)  BP(mean): 80 (19 Nov 2019 10:00) (80 - 110)  RR: 28 (19 Nov 2019 10:00) (18 - 28)  SpO2: 97% (19 Nov 2019 10:00) (93% - 98%)      PHYSICAL EXAM:  Constitutional: Comfortable . No acute distress.   HEENT: normocephalic , neck is supple . no JVD.   CNS: A&Ox3. No focal deficits. EOMI.   Lymph Nodes: Cervical : Not palpable.  Respiratory: RLL crackles   Cardiovascular: S1S2 RRR. 2/6 systolic murmur/No rubs or gallop.  Gastrointestinal: Soft non-tender and non distended . +Bowel sounds.   Extremities: No edema.   Psychiatric: Calm . no agitation.  Skin: ecchymosis R side face, orbital edema     Intake and output:   11-18 @ 07:01  -  11-19 @ 07:00  --------------------------------------------------------  IN: 470 mL / OUT: 250 mL / NET: 220 mL    11-19 @ 07:01  -  11-19 @ 10:18  --------------------------------------------------------  IN: 120 mL / OUT: 0 mL / NET: 120 mL        LABS:                        12.5   5.95  )-----------( 98       ( 19 Nov 2019 05:26 )             39.5     11-19    141  |  102  |  17.0  ----------------------------<  128<H>  4.1   |  27.0  |  0.81    Ca    8.5<L>      19 Nov 2019 05:26  Phos  3.3     11-19  Mg     1.8     11-19    TPro  6.5<L>  /  Alb  3.7  /  TBili  0.8  /  DBili  x   /  AST  47<H>  /  ALT  15  /  AlkPhos  90  11-19    CARDIAC MARKERS ( 19 Nov 2019 05:26 )  x     / 0.58 ng/mL / 172 U/L / x     / 12.6 ng/mL  CARDIAC MARKERS ( 19 Nov 2019 00:22 )  x     / 0.74 ng/mL / x     / x     / x          PT/INR - ( 19 Nov 2019 00:22 )   PT: 13.8 sec;   INR: 1.19 ratio     PTT - ( 19 Nov 2019 00:22 )  PTT:23.8 sec      INTERPRETATION OF TELEMETRY: SR, APCs  ECG: SR, NSST-T abnl     RADIOLOGY & ADDITIONAL STUDIES:    X-ray:  reviewed by me.   CT scan:   < from: CT Head No Cont (11.19.19 @ 06:49) >  IMPRESSION:  Small acute intraparenchymal hemorrhage in the right frontal lobe and   right subdural hematoma as described above.      < end of copied text >    MRI:

## 2019-11-19 NOTE — SWALLOW BEDSIDE ASSESSMENT ADULT - SWALLOW EVAL: CURRENT DIET
Diagnosis:Z47.89 (ICD-10-CM) - V54.9 (ICD-9-CM) - Orthopedic aftercare  S42.295D (ICD-10-CM) - V54.11 (ICD-9-CM) - Other closed nondisplaced fracture of proximal end of left humerus with routine healing, subsequent encounter    Authorized # of Visits:  10 abduction to 90 deg : 10x 2  1#    Man Mob:   PROM L shoulder all planes  LLLD stretch for ER  GH jt mobs grade II inf and post glides   STM to left posterior cuff, subscapularis, triceps/biceps, pect minor   Assisted pect minor stretches.     Assessment: P puree, honey thick fluids

## 2019-11-19 NOTE — CONSULT NOTE ADULT - SUBJECTIVE AND OBJECTIVE BOX
CC: Evaluation of Rehabilitation Needs  HPI: 85 y/o M  with PMHx of HTN was walking on the street and states he did not lose consciousness but fell over on his right side striking his head and his right chest wall on concrete. Laceration on right eyebrow repaired at [place]. At [facility] he was found to have a SDH, a small IPH, and an elevated troponin of .798. At [facility], patient c/o slight HA on the right side and pain in his right chest wall. GCS15 E4V5M6, moving all fours, no focal deficits, pupils R 3mm reactive/L 3mm reactive CXR no fracture or hemopneumothorax - at [place]. Secondary survey in ED was remarkable for R scalp hematoma, ecchymoses around right eye, right-sided chest wall tenderness. (19 Nov 2019) Pt was admitted for SDH/IPH and NSGY was consulted. NSGY rec neuro checks and repeat CT. PM&R was consulted to evaluate post-acute disposition.    REVIEW OF SYSTEMS  Constitutional - No fever, No fatigue  HEENT - No visual disturbances. +right eye pain  Respiratory -  No shortness of breath  Cardiovascular - + right rib pain  Gastrointestinal - No abdominal pain, No nausea, No vomiting  Genitourinary - No dysuria  Neurological - +headaches, No loss of strength, No numbness  Musculoskeletal - No muscle pain  Psychiatric - No depression, No anxiety    PAST MEDICAL & SURGICAL HISTORY  Traumatic subdural hemorrhage with loss of consciousness of unspecified duration, initial encounter  Subdural hematoma  Subarachnoid bleed    SOCIAL HISTORY  Smoking -  2 PPD - quit 40 years ago and has not smoked since  EtOH - Socially   Drugs - Denied    FUNCTIONAL HISTORY  Lives in a senior citizen condo with O steps to negotiate.   Independent in ambulation, ADL's, transfers prior to hospitalization    CURRENT FUNCTIONAL STATUS  Bed mobility - TBA  Transfers - TBA  Gait - TBA  ADL's - TBA    FAMILY HISTORY   Reviewed and non-contributory    ALLERGIES  Allergy Status Unknown    VITALS  T(C): 36.7 (11-19-19 @ 08:00)  T(F): 98.1 (11-19-19 @ 08:00), Max: 98.2 (11-19-19 @ 01:41)  HR: 57 (11-19-19 @ 10:00) (53 - 83)  BP: 116/56 (11-19-19 @ 10:00) (115/56 - 163/77)  RR:  (18 - 28)  SpO2:  (93% - 98%)  Wt(kg): --    PHYSICAL EXAM  Constitutional - NAD, Comfortable  HEENT - Right temporal and lateral orbit and lid ecchymosis and edema  Neck - Supple  Chest - good chest expansion, good resp effort  Cardio - warm and well perfused  Abdomen -  Soft, NTND  Extremities - No C/C/E, No calf tenderness   Neurologic Exam -                    Cognitive - Awake, Alert, AAO to self, place (with cuing), month, situation     Communication - Fluent, No dysarthria     Cranial Nerves - CN 2-12 grossly intact     Motor -                     LEFT    UE - ShAB 5/5, EF 5/5, EE 5/5, WE 5/5,  WNL                    RIGHT UE - ShAB 5/5, EF 5/5, EE 5/5, WE 5/5,  WNL                    LEFT    LE - HF 5/5, KE 5/5, DF 5/5, PF 5/5                    RIGHT LE - HF 5/5, KE 5/5, DF 5/5, PF 5/5        Sensory - Intact to light touch b/l      Reflexes - DTR Intact,  Loera's neg b/l  Psychiatric - Affect WNL    RECENT LABS/IMAGING                        12.5   5.95  )-----------( 98       ( 19 Nov 2019 05:26 )             39.5     11-19    141  |  102  |  17.0  ----------------------------<  128<H>  4.1   |  27.0  |  0.81    Ca    8.5<L>      19 Nov 2019 05:26  Phos  3.3     11-19  Mg     1.8     11-19    TPro  6.5<L>  /  Alb  3.7  /  TBili  0.8  /  DBili  x   /  AST  47<H>  /  ALT  15  /  AlkPhos  90  11-19    PT/INR - ( 19 Nov 2019 00:22 )   PT: 13.8 sec;   INR: 1.19 ratio    PTT - ( 19 Nov 2019 00:22 )  PTT:23.8 sec    < from: CT Head No Cont (11.19.19 @ 06:49) >   EXAM:  CT BRAIN                          PROCEDURE DATE:  11/19/2019    INTERPRETATION:  [facility] RADIOLOGIST PRELIMINARY REPORT  PROCEDURE INFORMATION:   Exam: CT Head Without Contrast   Exam date and time: 11/19/2019 6:44 AM   Clinical history: 86 years old, male; Other: Bleed; Patient HX: Follow up   TECHNIQUE:   Imaging protocol: Computed tomography of the head without contrast.   COMPARISON:   CT ANGIO BRAIN WITHOUT AND OR WITH IV CONTRAST 11/19/2019 12:36 AM   FINDINGS: Brain: Unchanged right frontal parenchymal hemorrhage with adjacent edema and   mild overlying sulcal effacement. Right frontotemporal convexity subdural hematoma has redistributed, but is similar in overall volume. No new intracranial hemorrhage. Diffuse cortical volume loss. Chronic microangiopathic changes.   Midline shift: No midline shift.   Ventricles: No hydrocephalus.   Bones/joints: Unremarkable. No acute fracture.   Sinuses: Fluid in the right maxillary sinus. Possible right maxillary sinus cyst or polyp.   Mastoid air cells: No mastoid effusion.   Soft tissues: Unchanged right periorbital soft tissue injury.   Vasculature: Atherosclerosis.   IMPRESSION:   1. Unchanged right frontal parenchymal hemorrhage with associated edema and mild overlying sulcal effacement.   2. Redistribution of right frontotemporal convexity subdural hematoma, which is similar in overall volume.   3. Nonacute/incidental findings above.  Official report:  CLINICAL STATEMENT: Follow-up bleed  TECHNIQUE: CT of the head was performed without IV contrast.  COMPARISON: CT head 11/19/2019 12:37 AM    FINDINGS:  There is mild diffuse parenchymal volume loss. There are areas of low attenuation in the periventricular white matter likely related to mild chronic microvascular ischemic changes.  1.5 x 0.9 cm acute intraparenchymal hemorrhage in the right frontal lobe with vasogenic edema without significant change  Right frontal temporal acute subdural hematoma has redistributed in location with volume stable to slightly smaller compared to prior exam, largest area measures approximately 2 mm in maximum width.  There is no  midline shift. There is no acute territorial infarct. There   is no hydrocephalus.  The cranium is intact. Right frontal soft tissue swelling Mucosal   thickening right maxillary sinus  IMPRESSION:  Small acute intraparenchymal hemorrhage in the right frontal lobe and right subdural hematoma as described above.      < end of copied text >    MEDICATIONS   MEDICATIONS  (STANDING):  chlorhexidine 2% Cloths 1 Application(s) Topical daily  enoxaparin Injectable 40 milliGRAM(s) SubCutaneous daily  gabapentin 100 milliGRAM(s) Oral every 8 hours    MEDICATIONS  (PRN):  acetaminophen   Tablet .. 650 milliGRAM(s) Oral every 6 hours PRN Mild Pain (1 - 3)  ondansetron Injectable 4 milliGRAM(s) IV Push every 4 hours PRN Nausea and/or Vomiting  oxyCODONE    IR 2.5 milliGRAM(s) Oral every 6 hours PRN Moderate Pain (4 - 6)  oxyCODONE    IR 5 milliGRAM(s) Oral every 6 hours PRN Severe Pain (7 - 10)      ASSESSMENT/PLAN  85 y/o M with Right IPH and Right SDH s/p mechanical fall now with right sided rib costo pain, functional, gait, and ADL impairments.    Disposition - Will continue to follow patient's functional status throughout acute care course. Pt is pending OT/PT evaluation and other neurological workup for his IPH/SDH.  PT - ROM, Bed Mob, Transfers, Amb with AD   OT - ADLs, ROM  SLP - Cognitive eval and treat  Right sided Rib Pain- consider adding Lidoderm patch  Precautions - Falls, Cardiac  DVT Prophylaxis - Held in setting of IPH as per primary team  Skin - Turn Q2hrs  Diet - NPO   Thank you for allowing us to participate in this patient's care

## 2019-11-20 LAB
ANION GAP SERPL CALC-SCNC: 10 MMOL/L — SIGNIFICANT CHANGE UP (ref 5–17)
BASOPHILS # BLD AUTO: 0.03 K/UL — SIGNIFICANT CHANGE UP (ref 0–0.2)
BASOPHILS NFR BLD AUTO: 0.6 % — SIGNIFICANT CHANGE UP (ref 0–2)
BUN SERPL-MCNC: 16 MG/DL — SIGNIFICANT CHANGE UP (ref 8–20)
CALCIUM SERPL-MCNC: 8.5 MG/DL — LOW (ref 8.6–10.2)
CHLORIDE SERPL-SCNC: 103 MMOL/L — SIGNIFICANT CHANGE UP (ref 98–107)
CO2 SERPL-SCNC: 29 MMOL/L — SIGNIFICANT CHANGE UP (ref 22–29)
CREAT SERPL-MCNC: 0.83 MG/DL — SIGNIFICANT CHANGE UP (ref 0.5–1.3)
EOSINOPHIL # BLD AUTO: 0.07 K/UL — SIGNIFICANT CHANGE UP (ref 0–0.5)
EOSINOPHIL NFR BLD AUTO: 1.4 % — SIGNIFICANT CHANGE UP (ref 0–6)
GLUCOSE SERPL-MCNC: 88 MG/DL — SIGNIFICANT CHANGE UP (ref 70–115)
HCT VFR BLD CALC: 37.3 % — LOW (ref 39–50)
HGB BLD-MCNC: 11.8 G/DL — LOW (ref 13–17)
IMM GRANULOCYTES NFR BLD AUTO: 0.2 % — SIGNIFICANT CHANGE UP (ref 0–1.5)
LYMPHOCYTES # BLD AUTO: 1.93 K/UL — SIGNIFICANT CHANGE UP (ref 1–3.3)
LYMPHOCYTES # BLD AUTO: 37.3 % — SIGNIFICANT CHANGE UP (ref 13–44)
MAGNESIUM SERPL-MCNC: 2.1 MG/DL — SIGNIFICANT CHANGE UP (ref 1.6–2.6)
MCHC RBC-ENTMCNC: 28.8 PG — SIGNIFICANT CHANGE UP (ref 27–34)
MCHC RBC-ENTMCNC: 31.6 GM/DL — LOW (ref 32–36)
MCV RBC AUTO: 91 FL — SIGNIFICANT CHANGE UP (ref 80–100)
MONOCYTES # BLD AUTO: 0.52 K/UL — SIGNIFICANT CHANGE UP (ref 0–0.9)
MONOCYTES NFR BLD AUTO: 10 % — SIGNIFICANT CHANGE UP (ref 2–14)
NEUTROPHILS # BLD AUTO: 2.62 K/UL — SIGNIFICANT CHANGE UP (ref 1.8–7.4)
NEUTROPHILS NFR BLD AUTO: 50.5 % — SIGNIFICANT CHANGE UP (ref 43–77)
PHOSPHATE SERPL-MCNC: 4.3 MG/DL — SIGNIFICANT CHANGE UP (ref 2.4–4.7)
PLATELET # BLD AUTO: 91 K/UL — LOW (ref 150–400)
POTASSIUM SERPL-MCNC: 4 MMOL/L — SIGNIFICANT CHANGE UP (ref 3.5–5.3)
POTASSIUM SERPL-SCNC: 4 MMOL/L — SIGNIFICANT CHANGE UP (ref 3.5–5.3)
RBC # BLD: 4.1 M/UL — LOW (ref 4.2–5.8)
RBC # FLD: 14.1 % — SIGNIFICANT CHANGE UP (ref 10.3–14.5)
SODIUM SERPL-SCNC: 142 MMOL/L — SIGNIFICANT CHANGE UP (ref 135–145)
TROPONIN T SERPL-MCNC: 0.5 NG/ML — HIGH (ref 0–0.06)
TROPONIN T SERPL-MCNC: 0.55 NG/ML — HIGH (ref 0–0.06)
WBC # BLD: 5.18 K/UL — SIGNIFICANT CHANGE UP (ref 3.8–10.5)
WBC # FLD AUTO: 5.18 K/UL — SIGNIFICANT CHANGE UP (ref 3.8–10.5)

## 2019-11-20 PROCEDURE — 93010 ELECTROCARDIOGRAM REPORT: CPT

## 2019-11-20 PROCEDURE — 99233 SBSQ HOSP IP/OBS HIGH 50: CPT

## 2019-11-20 PROCEDURE — 99232 SBSQ HOSP IP/OBS MODERATE 35: CPT

## 2019-11-20 PROCEDURE — 74230 X-RAY XM SWLNG FUNCJ C+: CPT | Mod: 26

## 2019-11-20 RX ORDER — METOPROLOL TARTRATE 50 MG
12.5 TABLET ORAL
Refills: 0 | Status: DISCONTINUED | OUTPATIENT
Start: 2019-11-20 | End: 2019-11-21

## 2019-11-20 RX ORDER — METOPROLOL TARTRATE 50 MG
25 TABLET ORAL DAILY
Refills: 0 | Status: DISCONTINUED | OUTPATIENT
Start: 2019-11-20 | End: 2019-11-20

## 2019-11-20 RX ADMIN — Medication 12.5 MILLIGRAM(S): at 09:55

## 2019-11-20 RX ADMIN — CELECOXIB 200 MILLIGRAM(S): 200 CAPSULE ORAL at 17:00

## 2019-11-20 RX ADMIN — Medication 650 MILLIGRAM(S): at 13:27

## 2019-11-20 RX ADMIN — ENOXAPARIN SODIUM 40 MILLIGRAM(S): 100 INJECTION SUBCUTANEOUS at 13:28

## 2019-11-20 RX ADMIN — Medication 650 MILLIGRAM(S): at 14:37

## 2019-11-20 RX ADMIN — SENNA PLUS 2 TABLET(S): 8.6 TABLET ORAL at 21:03

## 2019-11-20 RX ADMIN — GABAPENTIN 100 MILLIGRAM(S): 400 CAPSULE ORAL at 13:27

## 2019-11-20 RX ADMIN — LIDOCAINE 1 PATCH: 4 CREAM TOPICAL at 00:00

## 2019-11-20 RX ADMIN — PANTOPRAZOLE SODIUM 40 MILLIGRAM(S): 20 TABLET, DELAYED RELEASE ORAL at 06:01

## 2019-11-20 RX ADMIN — ATORVASTATIN CALCIUM 10 MILLIGRAM(S): 80 TABLET, FILM COATED ORAL at 21:03

## 2019-11-20 RX ADMIN — CELECOXIB 200 MILLIGRAM(S): 200 CAPSULE ORAL at 18:43

## 2019-11-20 RX ADMIN — CHLORHEXIDINE GLUCONATE 1 APPLICATION(S): 213 SOLUTION TOPICAL at 13:30

## 2019-11-20 RX ADMIN — OXYCODONE HYDROCHLORIDE 5 MILLIGRAM(S): 5 TABLET ORAL at 05:10

## 2019-11-20 RX ADMIN — GABAPENTIN 100 MILLIGRAM(S): 400 CAPSULE ORAL at 06:00

## 2019-11-20 RX ADMIN — OXYCODONE HYDROCHLORIDE 5 MILLIGRAM(S): 5 TABLET ORAL at 04:10

## 2019-11-20 RX ADMIN — CELECOXIB 200 MILLIGRAM(S): 200 CAPSULE ORAL at 06:01

## 2019-11-20 RX ADMIN — GABAPENTIN 100 MILLIGRAM(S): 400 CAPSULE ORAL at 21:03

## 2019-11-20 RX ADMIN — CELECOXIB 200 MILLIGRAM(S): 200 CAPSULE ORAL at 07:01

## 2019-11-20 NOTE — SWALLOW VFSS/MBS ASSESSMENT ADULT - ORAL PHASE
Uncontrolled bolus / spillover in santos-pharynx Within functional limits/Uncontrolled bolus / spillover in santos-pharynx

## 2019-11-20 NOTE — PROGRESS NOTE ADULT - SUBJECTIVE AND OBJECTIVE BOX
Patient has no new complaints, denies HA  or dizziness, seen by PT in ICU    PAST MEDICAL & SURGICAL HISTORY  Traumatic subdural hemorrhage with loss of consciousness of unspecified duration, initial encounter  Subdural hematoma  Subarachnoid bleed    SOCIAL HISTORY  Smoking -  2 PPD - quit 40 years ago and has not smoked since  EtOH - Socially   Drugs - Denied    FUNCTIONAL HISTORY  Lives in a senior citizen condo with O steps to negotiate.   Independent in ambulation, ADL's, transfers prior to hospitalization    REVIEW OF SYSTEMS  Constitutional - No fever,  No fatigue  HEENT - No vertigo, No neck pain  Neurological - No headaches, No memory loss, No loss of strength, No numbness, No tremors  Skin - No rashes, No lesions   Musculoskeletal - No joint pain, No joint swelling, No muscle pain  Psychiatric - No depression, No anxiety    FUNCTIONAL PROGRESS  11/20  Supervision with transfers, gait, Ambulated 25 ft with RW    VITALS  T(C): 36.6 (11-20-19 @ 07:43), Max: 36.9 (11-19-19 @ 12:27)  HR: 64 (11-20-19 @ 09:00) (50 - 75)  BP: 138/64 (11-20-19 @ 09:00) (89/53 - 159/66)  RR: 31 (11-20-19 @ 09:00) (17 - 47)  SpO2: 95% (11-20-19 @ 09:00) (86% - 98%)  Wt(kg): --    PHYSICAL EXAM  Constitutional - NAD, Comfortable  HEENT - Right temporal and lateral orbit and lid ecchymosis and edema, improved  Neck - Supple  Chest - good chest expansion, good resp effort  Cardio - warm and well perfused  Abdomen -  Soft, NTND  Extremities - No C/C/E, No calf tenderness   Neurologic Exam -                    Cognitive - Awake, Alert, AAO to self, place (with cuing), month, situation     Communication - Fluent, No dysarthria     Cranial Nerves - CN 2-12 grossly intact     Motor -                     LEFT    UE - ShAB 5/5, EF 5/5, EE 5/5, WE 5/5,  WNL                    RIGHT UE - ShAB 5/5, EF 5/5, EE 5/5, WE 5/5,  WNL                    LEFT    LE - HF 5/5, KE 5/5, DF 5/5, PF 5/5                    RIGHT LE - HF 5/5, KE 5/5, DF 5/5, PF 5/5        Sensory - Intact to light touch b/l      Reflexes - DTR Intact,  Loera's neg b/l  Psychiatric - Affect WNL    MEDICATIONS   acetaminophen   Tablet .. 650 milliGRAM(s) every 6 hours PRN  atorvastatin 10 milliGRAM(s) at bedtime  celecoxib 200 milliGRAM(s) two times a day  chlorhexidine 2% Cloths 1 Application(s) daily  enoxaparin Injectable 40 milliGRAM(s) daily  furosemide    Tablet 20 milliGRAM(s) <User Schedule>  gabapentin 100 milliGRAM(s) every 8 hours  lidocaine   Patch 1 Patch every 24 hours  metoprolol tartrate 12.5 milliGRAM(s) two times a day  ondansetron Injectable 4 milliGRAM(s) every 4 hours PRN  oxyCODONE    IR 2.5 milliGRAM(s) every 6 hours PRN  oxyCODONE    IR 5 milliGRAM(s) every 6 hours PRN  pantoprazole    Tablet 40 milliGRAM(s) before breakfast  polyethylene glycol 3350 17 Gram(s) daily PRN  senna 2 Tablet(s) at bedtime      RECENT LABS - Reviewed                        11.8   5.18  )-----------( 91       ( 20 Nov 2019 03:56 )             37.3     11-20    142  |  103  |  16.0  ----------------------------<  88  4.0   |  29.0  |  0.83    ASSESSMENT/PLAN  87 y/o M with Right IPH and Right SDH s/p mechanical fall now with right sided rib costo pain, functional, gait, and ADL impairments.    Disposition - Patient can be transferred home with Home PT when medically stable  PT - ROM, Bed Mob, Transfers, Amb with AD   OT - ADLs, ROM  SLP - Cognitive eval and treat  Right sided Rib Pain- consider adding Lidoderm patch  Precautions - Falls, Cardiac  DVT Prophylaxis - Held in setting of IPH as per primary team  Skin - Turn Q2hrs  Diet - NPO

## 2019-11-20 NOTE — PHYSICAL THERAPY INITIAL EVALUATION ADULT - DIAGNOSIS, PT EVAL
Decrease functional status and mobility due to decrease strength, endurance and ambulation tolerance

## 2019-11-20 NOTE — SWALLOW VFSS/MBS ASSESSMENT ADULT - ADDITIONAL RECOMMENDATIONS
Pt encouraged to seek GI follow up, as well as strict adherence to aspiration precautions during meals.

## 2019-11-20 NOTE — PROGRESS NOTE ADULT - SUBJECTIVE AND OBJECTIVE BOX
24 HOUR EVENTS: No acute events overnight.  Patient states right sided chest pain improved.  Denies HA, nausea, vomiting, weakness, visual changes.     SUBJECTIVE/ROS:  [ x] A ten-point review of systems was otherwise negative except as noted.  [ ] Due to altered mental status/intubation, subjective information were not able to be obtained from the patient. History was obtained, to the extent possible, from review of the chart and collateral sources of information.      NEURO  RASS:  0   GCS: 15    CAM ICU: neg  Exam: No focal neuro deficits   Meds: acetaminophen   Tablet .. 650 milliGRAM(s) Oral every 6 hours PRN Mild Pain (1 - 3)  celecoxib 200 milliGRAM(s) Oral two times a day  gabapentin 100 milliGRAM(s) Oral every 8 hours  ondansetron Injectable 4 milliGRAM(s) IV Push every 4 hours PRN Nausea and/or Vomiting  oxyCODONE    IR 2.5 milliGRAM(s) Oral every 6 hours PRN Moderate Pain (4 - 6)  oxyCODONE    IR 5 milliGRAM(s) Oral every 6 hours PRN Severe Pain (7 - 10)    [x] Adequacy of sedation and pain control has been assessed and adjusted      RESPIRATORY  RR: 47 (11-20-19 @ 04:00) (19 - 47)  SpO2: 95% (11-20-19 @ 04:00) (86% - 98%)  Wt(kg): --  Exam: unlabored, clear to auscultation bilaterally  Mechanical Ventilation:     [ ] Extubation Readiness Assessed  Meds:       CARDIOVASCULAR  HR: 64 (11-20-19 @ 04:00) (52 - 75)  BP: 143/73 (11-20-19 @ 04:00) (92/53 - 159/66)  BP(mean): 102 (11-20-19 @ 04:00) (68 - 102)  ABP: --  ABP(mean): --  Wt(kg): --  CVP(cm H2O): --  VBG - ( 19 Nov 2019 00:23 )  pH: x     /  pCO2: x     /  pO2: x     / HCO3: x     / Base Excess: x     /  SaO2: x      Lactate: 0.7                Exam: NSR   Cardiac Rhythm: RRR  Perfusion     [x ]Adequate   [ ]Inadequate  Mentation   [x ]Normal       [ ]Reduced  Extremities  [x ]Warm         [ ]Cool  Volume Status [ ]Hypervolemic [x ]Euvolemic [ ]Hypovolemic  Meds: furosemide    Tablet 20 milliGRAM(s) Oral <User Schedule>  metoprolol succinate ER 25 milliGRAM(s) Oral daily        GI/NUTRITION  Exam: obese, soft, nttp, nd  Diet: Dysphagia 2   Meds: pantoprazole    Tablet 40 milliGRAM(s) Oral before breakfast  polyethylene glycol 3350 17 Gram(s) Oral daily PRN Constipation  senna 2 Tablet(s) Oral at bedtime      GENITOURINARY  I&O's Detail    11-18 @ 07:01  -  11-19 @ 07:00  --------------------------------------------------------  IN:    sodium chloride 0.9%: 470 mL  Total IN: 470 mL    OUT:    Voided: 250 mL  Total OUT: 250 mL    Total NET: 220 mL      11-19 @ 07:01 - 11-20 @ 04:20  --------------------------------------------------------  IN:    Oral Fluid: 200 mL    sodium chloride 0.9%: 70 mL    Solution: 50 mL  Total IN: 320 mL    OUT:    Voided: 680 mL  Total OUT: 680 mL    Total NET: -360 mL          11-19    141  |  102  |  17.0  ----------------------------<  128<H>  4.1   |  27.0  |  0.81    Ca    8.5<L>      19 Nov 2019 05:26  Phos  3.3     11-19  Mg     1.8     11-19    TPro  6.5<L>  /  Alb  3.7  /  TBili  0.8  /  DBili  x   /  AST  47<H>  /  ALT  15  /  AlkPhos  90  11-19    [ ] Heart catheter, indication: N/A  Meds:     Constitutional: NAD, elderly male resting comfortably  Eyes: PERRL, EOM intact   Head: +abrasion to R eyebrow   Neck: trachea midline, FROM  Respiratory: CTA b/l, no WRR  Cardiovascular: S1S2, RRR  Gastrointestinal: abd. soft, NT/ND  Genitourinary: -heart  Extremities: no LE edema b/l  Neurological: AOx3, GCS 15, 5/5 strength b/l, sensation grossly intact  Skin: warm, dry, intact    HEMATOLOGIC  Meds: enoxaparin Injectable 40 milliGRAM(s) SubCutaneous daily    [x] VTE Prophylaxis                        12.5   5.95  )-----------( 98       ( 19 Nov 2019 05:26 )             39.5     PT/INR - ( 19 Nov 2019 00:22 )   PT: 13.8 sec;   INR: 1.19 ratio         PTT - ( 19 Nov 2019 00:22 )  PTT:23.8 sec  Transfusion     [ ] PRBC   [ ] Platelets   [ ] FFP   [ ] Cryoprecipitate      INFECTIOUS DISEASES  T(C): 36.4 (11-20-19 @ 03:52), Max: 36.9 (11-19-19 @ 12:27)  Wt(kg): --  WBC Count: 5.95 K/uL (11-19 @ 05:26)    Recent Cultures:    Meds:       ENDOCRINE  Capillary Blood Glucose    Meds: atorvastatin 10 milliGRAM(s) Oral at bedtime        ACCESS DEVICES:  [ ] Peripheral IV  [ ] Central Venous Line	[ ] R	[ ] L	[ ] IJ	[ ] Fem	[ ] SC	Placed:   [ ] Arterial Line		[ ] R	[ ] L	[ ] Fem	[ ] Rad	[ ] Ax	Placed:   [ ] PICC:					[ ] Mediport  [ ] Urinary Catheter, Date Placed:   [ ] Necessity of urinary, arterial, and venous catheters discussed    OTHER MEDICATIONS:  chlorhexidine 2% Cloths 1 Application(s) Topical daily  lidocaine   Patch 1 Patch Transdermal every 24 hours      CODE STATUS:     IMAGING:

## 2019-11-20 NOTE — PHYSICAL THERAPY INITIAL EVALUATION ADULT - PHYSICAL ASSIST/NONPHYSICAL ASSIST: STAND/SIT, REHAB EVAL
verbal cues re proper sequence + proper hand placement in prep to perform transfer/supervision/verbal cues

## 2019-11-20 NOTE — PHYSICAL THERAPY INITIAL EVALUATION ADULT - PHYSICAL ASSIST/NONPHYSICAL ASSIST: SIT/STAND, REHAB EVAL
verbal cues/verbal cues re proper sequence + proper hand placement in prep to perform transfer/supervision

## 2019-11-20 NOTE — OCCUPATIONAL THERAPY INITIAL EVALUATION ADULT - NS ASR OT EQUIP NEEDS DISCH
dressing/Patient deferred use of RW, presented as more hazardous with it than without, pt may benefit from a different type of device like a cane for ambulation.

## 2019-11-20 NOTE — PROGRESS NOTE ADULT - SUBJECTIVE AND OBJECTIVE BOX
85 yo M s/p mechanical fall (tripped, no LOC, remembers event, no preceding Sx reported).  GCS 15 on admission.  CTh with R small SDH + R frontal IPH.    No o/n event.    LABS: reviewed.  Recent imaging studies were reviewed.  MEDICATIONS: reviewed.    EXAMINATION:  General:  calm, cooperative. Sitting in a chair.  HEENT:  R paraorbital ecchymosis, EOMI, face - ?slight R facial, PERRLA.  Neuro:  awake, alert, oriented x 3, follows commands, moves all extremities - 5/5, sensation intact.  Cards:  S1S2 present  Respiratory:  no respiratory distress  Abdomen:  soft  Extremities:  no edema  Skin:  warm/dry

## 2019-11-20 NOTE — OCCUPATIONAL THERAPY INITIAL EVALUATION ADULT - LEVEL OF INDEPENDENCE: TOILET, REHAB EVAL
Pt was instructed in use of RW for ambulation, deferred use, stated felt more comfortable without and felt like it would cause him to trip. Pt did not furniture surf and demonstrated good balance during ambulation to toilet./supervision

## 2019-11-20 NOTE — PROGRESS NOTE ADULT - SUBJECTIVE AND OBJECTIVE BOX
HPI: 87 y/o male with a Right SDH, R parenchymal ICH s/p trip and fall 11/19. Pt admitted to SICU. No acute events overnight. Repeat CTH after injury stable, parenchymal ICH stable, SDH volume stable to slightly smaller.   Patient doing well. Neuro exam is intact/stable.     Vital Signs Last 24 Hrs  T(C): 36.6 (20 Nov 2019 07:43), Max: 36.9 (19 Nov 2019 12:27)  T(F): 97.8 (20 Nov 2019 07:43), Max: 98.4 (19 Nov 2019 12:27)  HR: 64 (20 Nov 2019 09:00) (50 - 75)  BP: 138/64 (20 Nov 2019 09:00) (89/53 - 159/66)  BP(mean): 91 (20 Nov 2019 09:00) (66 - 102)  RR: 31 (20 Nov 2019 09:00) (17 - 47)  SpO2: 95% (20 Nov 2019 09:00) (86% - 98%)  PHYSICAL EXAM:  GENERAL: NAD, well-groomed, well-developed  HEAD:  Ecchymosis on the lateral aspect of right eye,  normocephalic  STEVEN COMA SCORE: GCS 15  MENTAL STATUS: AAO x3, awake, Appropriately conversant without aphasia, following simple commands, short and long term memory intact  CRANIAL NERVES:  EOMI without nystagmus. Facial sensation intact V1-3 distribution b/l. Face symmetric w/ normal eye closure and smile, tongue midline.   MOTOR: strength 5/5 b/l upper and lower extremities  SENSATION: grossly intact to light touch all extremities    LABS:             11.8   5.18  )-----------( 91                   37.3     142  |  103  |  16.0  ----------------------------<  88  4.0   |  29.0  |  0.83    Ca    8.5<L>        Phos  4.3       Mg     2.1         TPro  6.5<L>  /  Alb  3.7  /  TBili  0.8  /  DBili  x   /  AST  47<H>  /  ALT  15  /  AlkPhos  90      RADIOLOGY & ADDITIONAL TESTS:   CT Head No Cont (11.19.19 @ 06:49)   IMPRESSION:   1. Unchanged right frontal parenchymal hemorrhage with associated edema   and   mild overlying sulcal effacement.   2. Redistribution of right frontotemporal convexity subdural hematoma,   which is   similar in overall volume.   3. Nonacute/incidental findings above.    CT Angio Head w/ IV Cont (11.19.19 @ 00:53)  IMPRESSION:   1. Right frontal parenchymal hemorrhage with associated edema.   2. Right frontal convexity subdural hematoma.   3. Right frontal sulcal effacement. No midline shift.   4. Nonacute/incidental findings above.     CT Head No Cont (11.19.19 @ 00:42)  IMPRESSION:  Right frontal acute intraparenchymal hemorrhage and right frontal   subdural hematoma as described above.  No hemodynamically significant stenosis or intracranial aneurysm.

## 2019-11-20 NOTE — PROGRESS NOTE ADULT - ASSESSMENT
85 y/o male with history of CAD on IVP06hh, HTN and aortic stenosis presented 11/9 with a right sided SDH/IPH after mechanical fall. Neuro exam stable since presentation, intact with stable GCS15.    PLAN:  - SBP goal <160   - OK for q4 neuro checks   - pain control - avoid oversedation  - MRI w/wo contrast pending to rule out underlying pathology  - lovenox for DVT prophylaxis 	  - continue plan per primary team   - Will await MRI results

## 2019-11-20 NOTE — OCCUPATIONAL THERAPY INITIAL EVALUATION ADULT - MANUAL MUSCLE TESTING RESULTS, REHAB EVAL
no strength deficits were identified/Bilateral UE WFL 4/4+ out of 5. Limited MMT on right side secondary to pain

## 2019-11-20 NOTE — PROGRESS NOTE ADULT - ASSESSMENT
86yMale presenting with:  Trip and fall leading to traumatic cerebral hemorrhagic contusion with edema, traumatic SDH, no LOC.  NSTEMI.      Neuro:  Traumatic SDH, frontal ICH-  continue neuro monitoring f/u nsg recs, consider q4hr today.  Adequate pain control, delirium precautions nonsedating pain meds.   HEENT: R periorbital contusion.  Symptomatic therapy as needed.  Ice to area.       CV: NSTEMI + CAD- consider antiplatelet w/ Aspirin when cleared by neurosurg, now poor candidate however will continue to follow risk/benefit     Pulm: No occult rib fx seen- reproducbile pain improving. Cont pain regimen, IS, deep breathing exercise and pulm hygiene.     GI/Nutrition:  Dysphagia diet, bowel reg.     : Voiding, replete lytes, maintain Na 140-145     Hem/ Dvt Proph: Scds, lovneox for dvt ppx     ID: No issues    Dispo: If okay with nsg downgrade level of care.   PT/OT/PMR.

## 2019-11-20 NOTE — PROGRESS NOTE ADULT - ASSESSMENT
Assessment:  87 yo M on ASA, presented s/p mechanical fall with mild TBI - small R SDH and R frontal IPH, ICH score 1. Etiology - amyloid vs traumatic vs underlying lesion.  Clinically and radiographically stable. CTA negative for vascular malformations.    Plan:  -please, continue neurochecks q4 hrs  -would consider MRI w/wo contrast to complete ICH w/up (r/o underlying lesion)  -please, keep Plt > 100,000 and INR < 1.4  -pls, keep off ASA/NSAIds for 5-7 days post bleeding  -stable to be transferred to the floor    Patient was critically ill and at high risk of death or neurologic complications due to re-bleeding, brain swelling/ compression/ herniation, cardiorespiratory failure.

## 2019-11-20 NOTE — OCCUPATIONAL THERAPY INITIAL EVALUATION ADULT - LEVEL OF INDEPENDENCE: DRESS LOWER BODY, OT EVAL
moderate assist (50% patients effort)/Patient able to independently don/doff sock on left foot, needed mod a on right foot to don sock. Pt interested in LB dressing devices

## 2019-11-20 NOTE — PHYSICAL THERAPY INITIAL EVALUATION ADULT - PHYSICAL ASSIST/NONPHYSICAL ASSIST: GAIT, REHAB EVAL
verbal cues re proper gait sequence + proper use of RW + 1 person to manage portable oxygen tank/1 person assist/verbal cues/supervision

## 2019-11-20 NOTE — PHYSICAL THERAPY INITIAL EVALUATION ADULT - GENERAL OBSERVATIONS, REHAB EVAL
pt observed supine in bed, pleasant, cooperative, A & O and c/o 6/10 right sided chest, flank and upper back regions

## 2019-11-20 NOTE — OCCUPATIONAL THERAPY INITIAL EVALUATION ADULT - PERTINENT HX OF CURRENT PROBLEM, REHAB EVAL
Pt missed the depth of a curb and fell forward landing on his face/head and right side. MRI w/andw/o ordered.

## 2019-11-20 NOTE — SWALLOW VFSS/MBS ASSESSMENT ADULT - NS SWALLOW VFSS REC ASPIR MON
change of breathing pattern/cough/gurgly voice/throat clearing/upper respiratory infection/oral hygiene/position upright (90Y)/fever/pneumonia

## 2019-11-20 NOTE — OCCUPATIONAL THERAPY INITIAL EVALUATION ADULT - PHYSICAL ASSIST/NONPHYSICAL ASSIST:DRESS LOWER BODY, OT EVAL
verbal cues/1 person assist/Verbal instruction for benefits of LB dressing devices-pt interested will attempt in next treatment session

## 2019-11-20 NOTE — SWALLOW VFSS/MBS ASSESSMENT ADULT - DIAGNOSTIC IMPRESSIONS
Oral and pharyngeal stages of swallow are functional. No penetration/aspiration observed during study. Pt with c/o globus sensation upon completion of study with notable throat clearing and cough in attempt to clear, however no PO observed in pharyngeal space or airway.

## 2019-11-20 NOTE — OCCUPATIONAL THERAPY INITIAL EVALUATION ADULT - DIAGNOSIS, OT EVAL
s/p mechanical fall (11/19/19) resulting in a small acute intraparenchymal hemorrhage in the Right frontal lobe and Right SDH

## 2019-11-20 NOTE — PHYSICAL THERAPY INITIAL EVALUATION ADULT - ADDITIONAL COMMENTS
pt lives alone in ground level condo, no YOLI, no stairs inside and bed & bath on ground level. Pt's PLOF was independent in all ADL's + ambulation without an Assistive Device and was driving PTA. Pt has shower chair DME in home (does not use it) and oxygen concentrator in home.

## 2019-11-20 NOTE — SWALLOW VFSS/MBS ASSESSMENT ADULT - PHARYNGEAL PHASE COMMENTS
+delayed throat clear after trials, however no PO observed in airway.    Pt also with c/o globus sensation upon completion of study with frequent throat clearing, however no PO observed in pharyngeal space or airway.

## 2019-11-20 NOTE — OCCUPATIONAL THERAPY INITIAL EVALUATION ADULT - LIVES WITH, PROFILE
alone/Pt lives alone in a ground floor condo, no YOLI and no steps inside. Pt has a daughter and son who live near by who will be able to assist as needed.

## 2019-11-20 NOTE — SWALLOW VFSS/MBS ASSESSMENT ADULT - RECOMMENDED FEEDING/EATING TECHNIQUES
oral hygiene/small sips/bites/crush medication PRN/position upright (90 degrees)/maintain upright posture during/after eating for 30 mins

## 2019-11-20 NOTE — OCCUPATIONAL THERAPY INITIAL EVALUATION ADULT - ADDITIONAL COMMENTS
Pt has a shower with curtains and grab bars  Standard height toilet with grab bars and a vanity to push off from  Pt owns: Shower chair   Pt is right handed and still drives  Pt is independent in all IADLs- cooking, laundry, food shopping

## 2019-11-20 NOTE — OCCUPATIONAL THERAPY INITIAL EVALUATION ADULT - PHYSICAL ASSIST/NONPHYSICAL ASSIST: TOILET, REHAB EVAL
Verbal cues for activity pacing and visual scanning to increase safety awareness/verbal cues/supervision

## 2019-11-21 LAB
ANION GAP SERPL CALC-SCNC: 11 MMOL/L — SIGNIFICANT CHANGE UP (ref 5–17)
BASOPHILS # BLD AUTO: 0.02 K/UL — SIGNIFICANT CHANGE UP (ref 0–0.2)
BASOPHILS NFR BLD AUTO: 0.4 % — SIGNIFICANT CHANGE UP (ref 0–2)
BUN SERPL-MCNC: 24 MG/DL — HIGH (ref 8–20)
CALCIUM SERPL-MCNC: 8.4 MG/DL — LOW (ref 8.6–10.2)
CHLORIDE SERPL-SCNC: 101 MMOL/L — SIGNIFICANT CHANGE UP (ref 98–107)
CO2 SERPL-SCNC: 28 MMOL/L — SIGNIFICANT CHANGE UP (ref 22–29)
CREAT SERPL-MCNC: 1.06 MG/DL — SIGNIFICANT CHANGE UP (ref 0.5–1.3)
EOSINOPHIL # BLD AUTO: 0.09 K/UL — SIGNIFICANT CHANGE UP (ref 0–0.5)
EOSINOPHIL NFR BLD AUTO: 1.8 % — SIGNIFICANT CHANGE UP (ref 0–6)
GLUCOSE SERPL-MCNC: 82 MG/DL — SIGNIFICANT CHANGE UP (ref 70–115)
HCT VFR BLD CALC: 36.7 % — LOW (ref 39–50)
HGB BLD-MCNC: 11.1 G/DL — LOW (ref 13–17)
IMM GRANULOCYTES NFR BLD AUTO: 0.2 % — SIGNIFICANT CHANGE UP (ref 0–1.5)
LYMPHOCYTES # BLD AUTO: 1.83 K/UL — SIGNIFICANT CHANGE UP (ref 1–3.3)
LYMPHOCYTES # BLD AUTO: 37.5 % — SIGNIFICANT CHANGE UP (ref 13–44)
MAGNESIUM SERPL-MCNC: 1.9 MG/DL — SIGNIFICANT CHANGE UP (ref 1.8–2.6)
MCHC RBC-ENTMCNC: 27.7 PG — SIGNIFICANT CHANGE UP (ref 27–34)
MCHC RBC-ENTMCNC: 30.2 GM/DL — LOW (ref 32–36)
MCV RBC AUTO: 91.5 FL — SIGNIFICANT CHANGE UP (ref 80–100)
MONOCYTES # BLD AUTO: 0.52 K/UL — SIGNIFICANT CHANGE UP (ref 0–0.9)
MONOCYTES NFR BLD AUTO: 10.7 % — SIGNIFICANT CHANGE UP (ref 2–14)
NEUTROPHILS # BLD AUTO: 2.41 K/UL — SIGNIFICANT CHANGE UP (ref 1.8–7.4)
NEUTROPHILS NFR BLD AUTO: 49.4 % — SIGNIFICANT CHANGE UP (ref 43–77)
PHOSPHATE SERPL-MCNC: 4.3 MG/DL — SIGNIFICANT CHANGE UP (ref 2.4–4.7)
PLATELET # BLD AUTO: 86 K/UL — LOW (ref 150–400)
POTASSIUM SERPL-MCNC: 4.4 MMOL/L — SIGNIFICANT CHANGE UP (ref 3.5–5.3)
POTASSIUM SERPL-SCNC: 4.4 MMOL/L — SIGNIFICANT CHANGE UP (ref 3.5–5.3)
RBC # BLD: 4.01 M/UL — LOW (ref 4.2–5.8)
RBC # FLD: 14 % — SIGNIFICANT CHANGE UP (ref 10.3–14.5)
SODIUM SERPL-SCNC: 140 MMOL/L — SIGNIFICANT CHANGE UP (ref 135–145)
T4 AB SER-ACNC: 4.9 UG/DL — SIGNIFICANT CHANGE UP (ref 4.5–12)
TROPONIN T SERPL-MCNC: 0.45 NG/ML — HIGH (ref 0–0.06)
TSH SERPL-MCNC: 4.64 UIU/ML — HIGH (ref 0.27–4.2)
WBC # BLD: 4.88 K/UL — SIGNIFICANT CHANGE UP (ref 3.8–10.5)
WBC # FLD AUTO: 4.88 K/UL — SIGNIFICANT CHANGE UP (ref 3.8–10.5)

## 2019-11-21 PROCEDURE — 99232 SBSQ HOSP IP/OBS MODERATE 35: CPT

## 2019-11-21 PROCEDURE — 71045 X-RAY EXAM CHEST 1 VIEW: CPT | Mod: 26

## 2019-11-21 PROCEDURE — 70553 MRI BRAIN STEM W/O & W/DYE: CPT | Mod: 26

## 2019-11-21 RX ORDER — METOPROLOL TARTRATE 50 MG
12.5 TABLET ORAL DAILY
Refills: 0 | Status: DISCONTINUED | OUTPATIENT
Start: 2019-11-21 | End: 2019-11-24

## 2019-11-21 RX ADMIN — OXYCODONE HYDROCHLORIDE 5 MILLIGRAM(S): 5 TABLET ORAL at 15:20

## 2019-11-21 RX ADMIN — CELECOXIB 200 MILLIGRAM(S): 200 CAPSULE ORAL at 18:57

## 2019-11-21 RX ADMIN — CELECOXIB 200 MILLIGRAM(S): 200 CAPSULE ORAL at 17:57

## 2019-11-21 RX ADMIN — PANTOPRAZOLE SODIUM 40 MILLIGRAM(S): 20 TABLET, DELAYED RELEASE ORAL at 05:43

## 2019-11-21 RX ADMIN — GABAPENTIN 100 MILLIGRAM(S): 400 CAPSULE ORAL at 05:44

## 2019-11-21 RX ADMIN — OXYCODONE HYDROCHLORIDE 5 MILLIGRAM(S): 5 TABLET ORAL at 16:20

## 2019-11-21 RX ADMIN — ATORVASTATIN CALCIUM 10 MILLIGRAM(S): 80 TABLET, FILM COATED ORAL at 22:14

## 2019-11-21 RX ADMIN — Medication 20 MILLIGRAM(S): at 09:36

## 2019-11-21 RX ADMIN — CELECOXIB 200 MILLIGRAM(S): 200 CAPSULE ORAL at 05:43

## 2019-11-21 RX ADMIN — GABAPENTIN 100 MILLIGRAM(S): 400 CAPSULE ORAL at 22:14

## 2019-11-21 RX ADMIN — SENNA PLUS 2 TABLET(S): 8.6 TABLET ORAL at 22:14

## 2019-11-21 RX ADMIN — CHLORHEXIDINE GLUCONATE 1 APPLICATION(S): 213 SOLUTION TOPICAL at 12:56

## 2019-11-21 RX ADMIN — GABAPENTIN 100 MILLIGRAM(S): 400 CAPSULE ORAL at 15:20

## 2019-11-21 NOTE — PROGRESS NOTE ADULT - SUBJECTIVE AND OBJECTIVE BOX
CARDIOLOGY PROGRESS NOTE   ([facility])                                                                                                        Subjective:     Vitals:  T(C): 36.9 (11-21-19 @ 11:43), Max: 37.1 (11-20-19 @ 19:15)  HR: 51 (11-21-19 @ 15:00) (48 - 67)  BP: 111/59 (11-21-19 @ 15:00) (94/53 - 138/62)  RR: 34 (11-21-19 @ 15:00) (17 - 54)  SpO2: 100% (11-21-19 @ 15:00) (94% - 100%)  Wt(kg): --  I&O's Summary    20 Nov 2019 07:01  -  21 Nov 2019 07:00  --------------------------------------------------------  IN: 1070 mL / OUT: 975 mL / NET: 95 mL          PHYSICAL EXAM:  Appearance: Normal	  HEENT:   Atraumatic  Cardiovascular: Normal S1 S2, No JVD, No murmurs, No edema  Respiratory: Lungs clear to auscultation	  Gastrointestinal:  Soft, Non-tender, + BS	  Skin: No rashes, No ecchymoses, No cyanosis  Neurologic: Alert and awake, able to move extremities  Extremities: No edema    TELEMETRY: 	    ECG:  	      DIAGNOSTIC TESTING:  [ ] Echocardiogram:  [ ]  Catheterization:  [ ] Stress Test:    OTHER: 	      CURRENT MEDICATIONS:  furosemide    Tablet 20 milliGRAM(s) Oral <User Schedule>  metoprolol tartrate 12.5 milliGRAM(s) Oral daily        acetaminophen   Tablet .. 650 milliGRAM(s) Oral every 6 hours PRN  celecoxib 200 milliGRAM(s) Oral two times a day  gabapentin 100 milliGRAM(s) Oral every 8 hours  ondansetron Injectable 4 milliGRAM(s) IV Push every 4 hours PRN  oxyCODONE    IR 2.5 milliGRAM(s) Oral every 6 hours PRN  oxyCODONE    IR 5 milliGRAM(s) Oral every 6 hours PRN    pantoprazole    Tablet 40 milliGRAM(s) Oral before breakfast  polyethylene glycol 3350 17 Gram(s) Oral daily PRN  senna 2 Tablet(s) Oral at bedtime    atorvastatin 10 milliGRAM(s) Oral at bedtime    chlorhexidine 2% Cloths 1 Application(s) Topical daily  enoxaparin Injectable 40 milliGRAM(s) SubCutaneous daily  lidocaine   Patch 1 Patch Transdermal every 24 hours      LABS:	 	    CARDIAC MARKERS:  Troponin I:   CPK total =  CK MB=   CKMB index=                           11.1   4.88  )-----------( 86       ( 21 Nov 2019 03:29 )             36.7     11-21    140  |  101  |  24.0<H>  ----------------------------<  82  4.4   |  28.0  |  1.06    Ca    8.4<L>      21 Nov 2019 03:29  Phos  4.3     11-21  Mg     1.9     11-21      proBNP:   Lipid Profile:   HgA1c:   TSH: Thyroid Stimulating Hormone, Serum: 4.64 uIU/mL (11-21 @ 03:29) CARDIOLOGY PROGRESS NOTE   ([facility])                                                                                                        Subjective:   sitting in chair, comfortable, denied cp, dyspnea    Vitals:  T(C): 36.9 (11-21-19 @ 11:43), Max: 37.1 (11-20-19 @ 19:15)  HR: 51 (11-21-19 @ 15:00) (48 - 67)  BP: 111/59 (11-21-19 @ 15:00) (94/53 - 138/62)  RR: 34 (11-21-19 @ 15:00) (17 - 54)  SpO2: 100% (11-21-19 @ 15:00) (94% - 100%)  Wt(kg): --  I&O's Summary    20 Nov 2019 07:01  -  21 Nov 2019 07:00  --------------------------------------------------------  IN: 1070 mL / OUT: 975 mL / NET: 95 mL          PHYSICAL EXAM:  Appearance: Normal	  HEENT:   Atraumatic  Cardiovascular: Normal S1 S2, No JVD, No murmurs, No edema  Respiratory: Lungs clear to auscultation	  Gastrointestinal:  Soft, Non-tender, + BS	  Skin: No rashes, No ecchymoses, No cyanosis  Neurologic: Alert and awake, able to move extremities  Extremities: No edema    TELEMETRY: 	    sr, sb periods at 40s          CURRENT MEDICATIONS:  furosemide    Tablet 20 milliGRAM(s) Oral <User Schedule>  metoprolol tartrate 12.5 milliGRAM(s) Oral daily  acetaminophen   Tablet .. 650 milliGRAM(s) Oral every 6 hours PRN  celecoxib 200 milliGRAM(s) Oral two times a day  gabapentin 100 milliGRAM(s) Oral every 8 hours  ondansetron Injectable 4 milliGRAM(s) IV Push every 4 hours PRN  oxyCODONE    IR 2.5 milliGRAM(s) Oral every 6 hours PRN  oxyCODONE    IR 5 milliGRAM(s) Oral every 6 hours PRN  pantoprazole    Tablet 40 milliGRAM(s) Oral before breakfast  polyethylene glycol 3350 17 Gram(s) Oral daily PRN  senna 2 Tablet(s) Oral at bedtime  atorvastatin 10 milliGRAM(s) Oral at bedtime  chlorhexidine 2% Cloths 1 Application(s) Topical daily  enoxaparin Injectable 40 milliGRAM(s) SubCutaneous daily  lidocaine   Patch 1 Patch Transdermal every 24 hours      LABS:	 	                            11.1   4.88  )-----------( 86       ( 21 Nov 2019 03:29 )             36.7     11-21    140  |  101  |  24.0<H>  ----------------------------<  82  4.4   |  28.0  |  1.06    Ca    8.4<L>      21 Nov 2019 03:29  Phos  4.3     11-21  Mg     1.9     11-21      TSH: Thyroid Stimulating Hormone, Serum: 4.64 uIU/mL (11-21 @ 03:29)

## 2019-11-21 NOTE — DIETITIAN INITIAL EVALUATION ADULT. - PERTINENT MEDS FT
MEDICATIONS  (STANDING):  atorvastatin 10 milliGRAM(s) Oral at bedtime  celecoxib 200 milliGRAM(s) Oral two times a day  chlorhexidine 2% Cloths 1 Application(s) Topical daily  enoxaparin Injectable 40 milliGRAM(s) SubCutaneous daily  furosemide    Tablet 20 milliGRAM(s) Oral <User Schedule>  gabapentin 100 milliGRAM(s) Oral every 8 hours  lidocaine   Patch 1 Patch Transdermal every 24 hours  metoprolol tartrate 12.5 milliGRAM(s) Oral daily  pantoprazole    Tablet 40 milliGRAM(s) Oral before breakfast  senna 2 Tablet(s) Oral at bedtime    MEDICATIONS  (PRN):  acetaminophen   Tablet .. 650 milliGRAM(s) Oral every 6 hours PRN Mild Pain (1 - 3)  ondansetron Injectable 4 milliGRAM(s) IV Push every 4 hours PRN Nausea and/or Vomiting  oxyCODONE    IR 2.5 milliGRAM(s) Oral every 6 hours PRN Moderate Pain (4 - 6)  oxyCODONE    IR 5 milliGRAM(s) Oral every 6 hours PRN Severe Pain (7 - 10)  polyethylene glycol 3350 17 Gram(s) Oral daily PRN Constipation

## 2019-11-21 NOTE — PROGRESS NOTE ADULT - ASSESSMENT
Assessment:  85 yo M on ASA, presented s/p mechanical fall with mild TBI - small R SDH and R frontal IPH, ICH score 1. Etiology - amyloid vs traumatic vs underlying lesion.  Clinically and radiographically stable. CTA negative for vascular malformations.    Plan:  -please, continue neurochecks q4 hrs  -pending MRI w/wo contrast to complete ICH w/up (r/o underlying lesion)  -please, keep Plt > 100,000 and INR < 1.4; received plts o/n for worsening thrombocytopenia  -pls, keep off ASA/NSAIds for 5-7 days post bleeding  -stable to be transferred to the floor    Patient was critically ill and at high risk of death or neurologic complications due to re-bleeding, brain swelling/ compression/ herniation, cardiorespiratory failure.

## 2019-11-21 NOTE — PROGRESS NOTE ADULT - SUBJECTIVE AND OBJECTIVE BOX
INTERVAL HPI/OVERNIGHT EVENTS:    86y Male s/p trip and fall found to have a SDH, IPH. Repeat CT's have been stable. Neuro exam stable. Plt 86 this AM, given a platelet transfusion - goal 100k. Repeat CBC pending. MRI brain pending today. PT ok for downgrade.      Vital Signs Last 24 Hrs  T(C): 36.8 (21 Nov 2019 07:56), Max: 37.2 (20 Nov 2019 15:37)  T(F): 98.2 (21 Nov 2019 07:56), Max: 98.9 (20 Nov 2019 15:37)  HR: 62 (21 Nov 2019 10:00) (48 - 67)  BP: 132/61 (21 Nov 2019 10:00) (94/53 - 138/62)  BP(mean): 88 (21 Nov 2019 10:00) (71 - 105)  RR: 46 (21 Nov 2019 10:00) (17 - 54)  SpO2: 97% (21 Nov 2019 10:00) (94% - 99%)    PHYSICAL EXAM:  GENERAL: NAD, well-groomed, well-developed  HEAD:  Atraumatic, normocephalic  STEVEN COMA SCORE: GCS 15  MENTAL STATUS: AAO x3, Opens eyes spontaneously, Appropriately conversant without aphasia, following commands   CRANIAL NERVES: Visual acuity normal for age, visual fields full to confrontation, PERRL. EOMI without nystagmus. Facial sensation intact V1-3 distribution b/l. Face symmetric w/ normal eye closure and smile, tongue midline. Hearing grossly intact. Speech clear. Head turning and shoulder shrug intact.   MOTOR: strength 5/5 b/l upper and lower extremities  Uppers     Delt     Bicep     Tricep     HG  R                5/5       5/5         5/5         5/5  L                5/5       5/5         5/5         5/5  Lowers      HF     KF      KE     PF     DF     EHL  R             5/5     5/5     5/5    5/5   5/5    5/5  L              5/5    5/5      5/5    5/5   5/5    5/5  SENSATION: grossly intact to light touch all extremities    LABS:                        11.1   4.88  )-----------( 86       ( 21 Nov 2019 03:29 )             36.7     11-21    140  |  101  |  24.0<H>  ----------------------------<  82  4.4   |  28.0  |  1.06    Ca    8.4<L>      21 Nov 2019 03:29  Phos  4.3     11-21  Mg     1.9     11-21 11-20 @ 07:01  -  11-21 @ 07:00  --------------------------------------------------------  IN: 1070 mL / OUT: 975 mL / NET: 95 mL        RADIOLOGY & ADDITIONAL TESTS: INTERVAL HPI/OVERNIGHT EVENTS:    86y Male s/p trip and fall found to have a SDH, IPH. Repeat CT's have been stable. Neuro exam stable. Plt 86 this AM, given a platelet transfusion - goal 100k. Repeat CBC pending. MRI brain pending today. PT ok for downgrade.      Vital Signs Last 24 Hrs  T(C): 36.8 (21 Nov 2019 07:56), Max: 37.2 (20 Nov 2019 15:37)  T(F): 98.2 (21 Nov 2019 07:56), Max: 98.9 (20 Nov 2019 15:37)  HR: 62 (21 Nov 2019 10:00) (48 - 67)  BP: 132/61 (21 Nov 2019 10:00) (94/53 - 138/62)  BP(mean): 88 (21 Nov 2019 10:00) (71 - 105)  RR: 46 (21 Nov 2019 10:00) (17 - 54)  SpO2: 97% (21 Nov 2019 10:00) (94% - 99%)    PHYSICAL EXAM:  GENERAL: NAD, well-groomed, well-developed  HEAD:  Normocephalic  STEVEN COMA SCORE: GCS 15  MENTAL STATUS: AAO x3, Opens eyes spontaneously, Appropriately conversant without aphasia, following commands   CRANIAL NERVES: Visual acuity normal for age, visual fields full to confrontation, PERRL. EOMI without nystagmus. Facial sensation intact V1-3 distribution b/l. Face symmetric w/ normal eye closure and smile, tongue midline. Hearing grossly intact. Speech clear. Head turning and shoulder shrug intact.   MOTOR: strength 5/5 b/l upper and lower extremities  Uppers     Delt     Bicep     Tricep     HG  R                5/5       5/5         5/5         5/5  L                5/5       5/5         5/5         5/5  Lowers      HF     KF      KE     PF     DF     EHL  R             5/5     5/5     5/5    5/5   5/5    5/5  L              5/5    5/5      5/5    5/5   5/5    5/5  SENSATION: grossly intact to light touch all extremities    LABS:                        11.1   4.88  )-----------( 86       ( 21 Nov 2019 03:29 )             36.7     11-21    140  |  101  |  24.0<H>  ----------------------------<  82  4.4   |  28.0  |  1.06    Ca    8.4<L>      21 Nov 2019 03:29  Phos  4.3     11-21  Mg     1.9     11-21 11-20 @ 07:01  -  11-21 @ 07:00  --------------------------------------------------------  IN: 1070 mL / OUT: 975 mL / NET: 95 mL        RADIOLOGY & ADDITIONAL TESTS:

## 2019-11-21 NOTE — PROGRESS NOTE ADULT - ASSESSMENT
A/P: 87 y/o male s/p SDH, IPH s/p trip and fall. Pending MRI brain.     Plan:  Discussed with Dr. Rodriguez   - continue neurochecks q4 hrs  - MRI w/wo contrast to r/o underlying lesion   - please, keep Plt > 100,000 and INR < 1.4  - keep off ASA/NSAIds for 5-7 days post bleeding  - Stable to downgrade to floor.   - NS will continue to follow.   - Continue lovenox for DVT prop.     NOTE IS INCOMPLETE!!!!

## 2019-11-21 NOTE — PROGRESS NOTE ADULT - SUBJECTIVE AND OBJECTIVE BOX
INTERVAL HPI/OVERNIGHT EVENTS/SUBJECTIVE:    ICU Vital Signs Last 24 Hrs  T(C): 36.5 (21 Nov 2019 04:24), Max: 37.2 (20 Nov 2019 15:37)  T(F): 97.7 (21 Nov 2019 04:24), Max: 98.9 (20 Nov 2019 15:37)  HR: 49 (21 Nov 2019 04:00) (48 - 68)  BP: 98/53 (21 Nov 2019 04:00) (89/53 - 146/65)  BP(mean): 74 (21 Nov 2019 04:00) (66 - 105)  ABP: --  ABP(mean): --  RR: 19 (21 Nov 2019 04:00) (17 - 54)  SpO2: 97% (21 Nov 2019 04:00) (93% - 99%)      I&O's Detail    19 Nov 2019 07:01  -  20 Nov 2019 07:00  --------------------------------------------------------  IN:    Oral Fluid: 320 mL    sodium chloride 0.9%: 70 mL    Solution: 50 mL  Total IN: 440 mL    OUT:    Voided: 680 mL  Total OUT: 680 mL    Total NET: -240 mL      20 Nov 2019 07:01  -  21 Nov 2019 05:09  --------------------------------------------------------  IN:    Oral Fluid: 1010 mL  Total IN: 1010 mL    OUT:    Voided: 775 mL  Total OUT: 775 mL    Total NET: 235 mL                MEDICATIONS  (STANDING):  atorvastatin 10 milliGRAM(s) Oral at bedtime  celecoxib 200 milliGRAM(s) Oral two times a day  chlorhexidine 2% Cloths 1 Application(s) Topical daily  enoxaparin Injectable 40 milliGRAM(s) SubCutaneous daily  furosemide    Tablet 20 milliGRAM(s) Oral <User Schedule>  gabapentin 100 milliGRAM(s) Oral every 8 hours  lidocaine   Patch 1 Patch Transdermal every 24 hours  metoprolol tartrate 12.5 milliGRAM(s) Oral two times a day  pantoprazole    Tablet 40 milliGRAM(s) Oral before breakfast  senna 2 Tablet(s) Oral at bedtime    MEDICATIONS  (PRN):  acetaminophen   Tablet .. 650 milliGRAM(s) Oral every 6 hours PRN Mild Pain (1 - 3)  ondansetron Injectable 4 milliGRAM(s) IV Push every 4 hours PRN Nausea and/or Vomiting  oxyCODONE    IR 2.5 milliGRAM(s) Oral every 6 hours PRN Moderate Pain (4 - 6)  oxyCODONE    IR 5 milliGRAM(s) Oral every 6 hours PRN Severe Pain (7 - 10)  polyethylene glycol 3350 17 Gram(s) Oral daily PRN Constipation      NUTRITION/IVF:     CENTRAL LINE:  LOCATION:   DATE INSERTED:  CVP:  SCVO2:    JIMÉNEZ:   DATE INSERTED:    A-LINE:    LOCATION:   DATE INSERTED:   SVV:  CO/CI:     CHEST TUBE:  LOCATION:  DATE INSERTED: OUTPUT/24 HRS:  SUCTION/WATER SEAL:     NG/OG TUBE:  DATE INSERTED:  OUTPUT/24 HRS:    MISC:     PHYSICAL EXAM:    Gen:    Eyes:    Neurological:    ENMT:    Neck:    Pulmonary:    Cardiovascular:    Gastrointestinal:    Genitourinary:    Back:    Extremities:    Skin:    Musculoskeletal:          LABS:  CBC Full  -  ( 21 Nov 2019 03:29 )  WBC Count : 4.88 K/uL  RBC Count : 4.01 M/uL  Hemoglobin : 11.1 g/dL  Hematocrit : 36.7 %  Platelet Count - Automated : 86 K/uL  Mean Cell Volume : 91.5 fl  Mean Cell Hemoglobin : 27.7 pg  Mean Cell Hemoglobin Concentration : 30.2 gm/dL  Auto Neutrophil # : 2.41 K/uL  Auto Lymphocyte # : 1.83 K/uL  Auto Monocyte # : 0.52 K/uL  Auto Eosinophil # : 0.09 K/uL  Auto Basophil # : 0.02 K/uL  Auto Neutrophil % : 49.4 %  Auto Lymphocyte % : 37.5 %  Auto Monocyte % : 10.7 %  Auto Eosinophil % : 1.8 %  Auto Basophil % : 0.4 %    11-21    140  |  101  |  24.0<H>  ----------------------------<  82  4.4   |  28.0  |  1.06    Ca    8.4<L>      21 Nov 2019 03:29  Phos  4.3     11-21  Mg     1.9     11-21          RECENT CULTURES:        CARDIAC MARKERS ( 21 Nov 2019 03:29 )  x     / 0.45 ng/mL / x     / x     / x      CARDIAC MARKERS ( 20 Nov 2019 19:03 )  x     / 0.50 ng/mL / x     / x     / x      CARDIAC MARKERS ( 20 Nov 2019 10:23 )  x     / 0.55 ng/mL / x     / x     / x      CARDIAC MARKERS ( 19 Nov 2019 05:26 )  x     / 0.58 ng/mL / 172 U/L / x     / 12.6 ng/mL      CAPILLARY BLOOD GLUCOSE      RADIOLOGY & ADDITIONAL STUDIES:    ASSESSMENT/PLAN:  86yMale presenting with:    Neuro:    CV:    Pulm:    GI/Nutrition:    /Renal:    ID:    Lines/Tubes:    Endo:    Skin:    Proph:    Dispo:      CRITICAL CARE TIME SPENT: INTERVAL HPI/OVERNIGHT EVENTS/SUBJECTIVE:  Pt with episodes bradycardia     ICU Vital Signs Last 24 Hrs  T(C): 36.5 (21 Nov 2019 04:24), Max: 37.2 (20 Nov 2019 15:37)  T(F): 97.7 (21 Nov 2019 04:24), Max: 98.9 (20 Nov 2019 15:37)  HR: 49 (21 Nov 2019 04:00) (48 - 68)  BP: 98/53 (21 Nov 2019 04:00) (89/53 - 146/65)  BP(mean): 74 (21 Nov 2019 04:00) (66 - 105)  ABP: --  ABP(mean): --  RR: 19 (21 Nov 2019 04:00) (17 - 54)  SpO2: 97% (21 Nov 2019 04:00) (93% - 99%)      I&O's Detail    19 Nov 2019 07:01  -  20 Nov 2019 07:00  --------------------------------------------------------  IN:    Oral Fluid: 320 mL    sodium chloride 0.9%: 70 mL    Solution: 50 mL  Total IN: 440 mL    OUT:    Voided: 680 mL  Total OUT: 680 mL    Total NET: -240 mL      20 Nov 2019 07:01  -  21 Nov 2019 05:09  --------------------------------------------------------  IN:    Oral Fluid: 1010 mL  Total IN: 1010 mL    OUT:    Voided: 775 mL  Total OUT: 775 mL    Total NET: 235 mL                MEDICATIONS  (STANDING):  atorvastatin 10 milliGRAM(s) Oral at bedtime  celecoxib 200 milliGRAM(s) Oral two times a day  chlorhexidine 2% Cloths 1 Application(s) Topical daily  enoxaparin Injectable 40 milliGRAM(s) SubCutaneous daily  furosemide    Tablet 20 milliGRAM(s) Oral <User Schedule>  gabapentin 100 milliGRAM(s) Oral every 8 hours  lidocaine   Patch 1 Patch Transdermal every 24 hours  metoprolol tartrate 12.5 milliGRAM(s) Oral two times a day  pantoprazole    Tablet 40 milliGRAM(s) Oral before breakfast  senna 2 Tablet(s) Oral at bedtime    MEDICATIONS  (PRN):  acetaminophen   Tablet .. 650 milliGRAM(s) Oral every 6 hours PRN Mild Pain (1 - 3)  ondansetron Injectable 4 milliGRAM(s) IV Push every 4 hours PRN Nausea and/or Vomiting  oxyCODONE    IR 2.5 milliGRAM(s) Oral every 6 hours PRN Moderate Pain (4 - 6)  oxyCODONE    IR 5 milliGRAM(s) Oral every 6 hours PRN Severe Pain (7 - 10)  polyethylene glycol 3350 17 Gram(s) Oral daily PRN Constipation      NUTRITION/IVF:     CENTRAL LINE:  LOCATION:   DATE INSERTED:  CVP:  SCVO2:    JIMÉNEZ:   DATE INSERTED:    A-LINE:    LOCATION:   DATE INSERTED:   SVV:  CO/CI:     CHEST TUBE:  LOCATION:  DATE INSERTED: OUTPUT/24 HRS:  SUCTION/WATER SEAL:     NG/OG TUBE:  DATE INSERTED:  OUTPUT/24 HRS:    MISC:     PHYSICAL EXAM:    Gen:    Eyes:    Neurological:    ENMT:    Neck:    Pulmonary:    Cardiovascular:    Gastrointestinal:    Genitourinary:    Back:    Extremities:    Skin:    Musculoskeletal:          LABS:  CBC Full  -  ( 21 Nov 2019 03:29 )  WBC Count : 4.88 K/uL  RBC Count : 4.01 M/uL  Hemoglobin : 11.1 g/dL  Hematocrit : 36.7 %  Platelet Count - Automated : 86 K/uL  Mean Cell Volume : 91.5 fl  Mean Cell Hemoglobin : 27.7 pg  Mean Cell Hemoglobin Concentration : 30.2 gm/dL  Auto Neutrophil # : 2.41 K/uL  Auto Lymphocyte # : 1.83 K/uL  Auto Monocyte # : 0.52 K/uL  Auto Eosinophil # : 0.09 K/uL  Auto Basophil # : 0.02 K/uL  Auto Neutrophil % : 49.4 %  Auto Lymphocyte % : 37.5 %  Auto Monocyte % : 10.7 %  Auto Eosinophil % : 1.8 %  Auto Basophil % : 0.4 %    11-21    140  |  101  |  24.0<H>  ----------------------------<  82  4.4   |  28.0  |  1.06    Ca    8.4<L>      21 Nov 2019 03:29  Phos  4.3     11-21  Mg     1.9     11-21          RECENT CULTURES:        CARDIAC MARKERS ( 21 Nov 2019 03:29 )  x     / 0.45 ng/mL / x     / x     / x      CARDIAC MARKERS ( 20 Nov 2019 19:03 )  x     / 0.50 ng/mL / x     / x     / x      CARDIAC MARKERS ( 20 Nov 2019 10:23 )  x     / 0.55 ng/mL / x     / x     / x      CARDIAC MARKERS ( 19 Nov 2019 05:26 )  x     / 0.58 ng/mL / 172 U/L / x     / 12.6 ng/mL      CAPILLARY BLOOD GLUCOSE      RADIOLOGY & ADDITIONAL STUDIES:    ASSESSMENT/PLAN:  86yMale presenting with:    Neuro:    CV:    Pulm:    GI/Nutrition:    /Renal:    ID:    Lines/Tubes:    Endo:    Skin:    Proph:    Dispo:      CRITICAL CARE TIME SPENT: INTERVAL HPI/OVERNIGHT EVENTS/SUBJECTIVE:  Pt with episodes bradycardia with hypotension.  Metoprolol dosing readjusted.  MRI rescheduled for this am given bradycardia.  Neurochecks liberated.  Overnight pt did well, HR to 49 however hemodynamically ok.  Neurologically intact.       ICU Vital Signs Last 24 Hrs  T(C): 36.5 (21 Nov 2019 04:24), Max: 37.2 (20 Nov 2019 15:37)  T(F): 97.7 (21 Nov 2019 04:24), Max: 98.9 (20 Nov 2019 15:37)  HR: 49 (21 Nov 2019 04:00) (48 - 68)  BP: 98/53 (21 Nov 2019 04:00) (89/53 - 146/65)  BP(mean): 74 (21 Nov 2019 04:00) (66 - 105)  ABP: --  ABP(mean): --  RR: 19 (21 Nov 2019 04:00) (17 - 54)  SpO2: 97% (21 Nov 2019 04:00) (93% - 99%)      I&O's Detail    19 Nov 2019 07:01  -  20 Nov 2019 07:00  --------------------------------------------------------  IN:    Oral Fluid: 320 mL    sodium chloride 0.9%: 70 mL    Solution: 50 mL  Total IN: 440 mL    OUT:    Voided: 680 mL  Total OUT: 680 mL    Total NET: -240 mL      20 Nov 2019 07:01  -  21 Nov 2019 05:09  --------------------------------------------------------  IN:    Oral Fluid: 1010 mL  Total IN: 1010 mL    OUT:    Voided: 775 mL  Total OUT: 775 mL    Total NET: 235 mL                MEDICATIONS  (STANDING):  atorvastatin 10 milliGRAM(s) Oral at bedtime  celecoxib 200 milliGRAM(s) Oral two times a day  chlorhexidine 2% Cloths 1 Application(s) Topical daily  enoxaparin Injectable 40 milliGRAM(s) SubCutaneous daily  furosemide    Tablet 20 milliGRAM(s) Oral <User Schedule>  gabapentin 100 milliGRAM(s) Oral every 8 hours  lidocaine   Patch 1 Patch Transdermal every 24 hours  metoprolol tartrate 12.5 milliGRAM(s) Oral two times a day  pantoprazole    Tablet 40 milliGRAM(s) Oral before breakfast  senna 2 Tablet(s) Oral at bedtime    MEDICATIONS  (PRN):  acetaminophen   Tablet .. 650 milliGRAM(s) Oral every 6 hours PRN Mild Pain (1 - 3)  ondansetron Injectable 4 milliGRAM(s) IV Push every 4 hours PRN Nausea and/or Vomiting  oxyCODONE    IR 2.5 milliGRAM(s) Oral every 6 hours PRN Moderate Pain (4 - 6)  oxyCODONE    IR 5 milliGRAM(s) Oral every 6 hours PRN Severe Pain (7 - 10)  polyethylene glycol 3350 17 Gram(s) Oral daily PRN Constipation      NUTRITION/IVF: DASH dysphagia 2    CENTRAL LINE: No    JIMÉNEZ:   No    A-LINE: No    PHYSICAL EXAM:    Gen:  NAD, sitting upright    Eyes:  EOMI's BL, pupils 4mm round and reactive BL    Neurological:  GCS 15    ENMT:  MMM    Neck:  Supple    Pulmonary: CTAB, chest wall TTP    Cardiovascular:  NSR    Gastrointestinal:  Soft, NTND    Genitourinary:  Voids    Extremities:  No pedal edema     Skin:  Pink, warm, dry    Musculoskeletal:  AFROM x 4 without deficit          LABS:  CBC Full  -  ( 21 Nov 2019 03:29 )  WBC Count : 4.88 K/uL  RBC Count : 4.01 M/uL  Hemoglobin : 11.1 g/dL  Hematocrit : 36.7 %  Platelet Count - Automated : 86 K/uL  Mean Cell Volume : 91.5 fl  Mean Cell Hemoglobin : 27.7 pg  Mean Cell Hemoglobin Concentration : 30.2 gm/dL  Auto Neutrophil # : 2.41 K/uL  Auto Lymphocyte # : 1.83 K/uL  Auto Monocyte # : 0.52 K/uL  Auto Eosinophil # : 0.09 K/uL  Auto Basophil # : 0.02 K/uL  Auto Neutrophil % : 49.4 %  Auto Lymphocyte % : 37.5 %  Auto Monocyte % : 10.7 %  Auto Eosinophil % : 1.8 %  Auto Basophil % : 0.4 %    11-21    140  |  101  |  24.0<H>  ----------------------------<  82  4.4   |  28.0  |  1.06    Ca    8.4<L>      21 Nov 2019 03:29  Phos  4.3     11-21  Mg     1.9     11-21          RECENT CULTURES:        CARDIAC MARKERS ( 21 Nov 2019 03:29 )  x     / 0.45 ng/mL / x     / x     / x      CARDIAC MARKERS ( 20 Nov 2019 19:03 )  x     / 0.50 ng/mL / x     / x     / x      CARDIAC MARKERS ( 20 Nov 2019 10:23 )  x     / 0.55 ng/mL / x     / x     / x      CARDIAC MARKERS ( 19 Nov 2019 05:26 )  x     / 0.58 ng/mL / 172 U/L / x     / 12.6 ng/mL      CAPILLARY BLOOD GLUCOSE      RADIOLOGY & ADDITIONAL STUDIES:    ASSESSMENT/PLAN:  86yMale presenting with:  s/p found down after fall with traumatic intraparenchymal hemorrhage, NSTEMI vs cardiac stress, clinic rib fractures    Neuro:  Pt scheduled for MRI Head today.  Likely able to continue to liberate Neurochecks,  pain meds for possible rib fractures    CV:  Hemodynamically normal, afebrile.  Metoprolol dose split during day yester, HR improved.  Trop down trending, reengage Cards today    Pulm:  OOBTC, chest PT, IS    GI/Nutrition:  Pt on regular diet, DASH dysphagia 2, bowel regimen on    /Renal:  Voids    ID:  None    Lines/Tubes:  PIV    Endo:  No issues    Skin:  Intact    Proph:  SCD's, Lovenox    Dispo:  MRI Head today, likely able to downgrade should pt's HR remain WNL and no more episodes symptomatic bradycardia.  Trop downtrending.  Will reengage Cards today.        CRITICAL CARE TIME SPENT:

## 2019-11-21 NOTE — DIETITIAN INITIAL EVALUATION ADULT. - OTHER INFO
86 year old male s/p trip and fall found to have a SDH, IPH. MRI brain pending today. Pt reports good appetite/po intake PTA. States he was following a lower sodium diet (regular consistency). Aware pt currently on mechanical soft diet -- seen by SLP yesterday 11/20 with recommendations for a regular diet/thin liquids, however, pt states he is tolerating mechanical soft diet well and would prefer to keep ordered. Currently, pt is with fair po intake (consuming ~50% of meals), states his appetite has been decreased since admission. Denies any weight changes PTA. Encouraged to make protein a priority -- pt verbalized understanding.

## 2019-11-21 NOTE — CHART NOTE - NSCHARTNOTEFT_GEN_A_CORE
SICU TRANSFER NOTE  -----------------------------  ICU Admission Date: 11/19/19  Transfer Date: 11-21-19 @ 18:03    Admission Diagnosis: R frontal intraparenchymal hemorrhage, elevated troponin    Active Problems/injuries: R frontal intraparenchymal hemorrhage, possible rib fracture, bradycardia    Procedures: none    Consultants:  [x] Cardiology  [ ] Endocrine  [ ] Infectious Disease  [ ] Medicine  [x]Neurosurgery  [ ] Ortho       [ ] Weight Bearing Status:  [ ] Palliative       [ ] Advanced Directives:    [x] Physical Medicine and Rehab       [x] Disposition : home w/ home PT  [ ] Plastics  [ ] Pulmonary    Medications  acetaminophen   Tablet .. 650 milliGRAM(s) Oral every 6 hours PRN  atorvastatin 10 milliGRAM(s) Oral at bedtime  celecoxib 200 milliGRAM(s) Oral two times a day  chlorhexidine 2% Cloths 1 Application(s) Topical daily  enoxaparin Injectable 40 milliGRAM(s) SubCutaneous daily  furosemide    Tablet 20 milliGRAM(s) Oral <User Schedule>  gabapentin 100 milliGRAM(s) Oral every 8 hours  lidocaine   Patch 1 Patch Transdermal every 24 hours  metoprolol tartrate 12.5 milliGRAM(s) Oral daily  ondansetron Injectable 4 milliGRAM(s) IV Push every 4 hours PRN  oxyCODONE    IR 2.5 milliGRAM(s) Oral every 6 hours PRN  oxyCODONE    IR 5 milliGRAM(s) Oral every 6 hours PRN  pantoprazole    Tablet 40 milliGRAM(s) Oral before breakfast  polyethylene glycol 3350 17 Gram(s) Oral daily PRN  senna 2 Tablet(s) Oral at bedtime      [x] I attest I have reviewed and reconciled all medications prior to transfer    IV Fluids  sodium chloride 0.9%.: Solution, 1000 milliLiter(s) infuse at 70 mL/Hr  Provider's Contact #: [phone]      I have discussed this case with ACS/trauma resident upon transfer and all questions regarding ICU course were answered.

## 2019-11-21 NOTE — DIETITIAN INITIAL EVALUATION ADULT. - CONTINUE CURRENT NUTRITION CARE PLAN
-- add Ensure Enlive BID to optimize po intake and provide an additional 350 kcal, 20g protein per serving./yes

## 2019-11-21 NOTE — DIETITIAN INITIAL EVALUATION ADULT. - PERTINENT LABORATORY DATA
11-21 Na140 mmol/L Glu 82 mg/dL K+ 4.4 mmol/L Cr  1.06 mg/dL BUN 24.0 mg/dL<H> Phos 4.3 mg/dL Alb n/a   PAB n/a

## 2019-11-21 NOTE — CHART NOTE - NSCHARTNOTEFT_GEN_A_CORE
Patient offered platelet transfusion for thrombocytopenia in the setting of IPH and SDH, however after explaining the risk vs. benefits patient refusing.  Will continue to monitor.

## 2019-11-21 NOTE — PROGRESS NOTE ADULT - ASSESSMENT
87 y/o male PMH HTN, hernia, AS, CAD (6/2019 [facility] RCA 80%) transfer from [facility] s/p trip and fall, SDH, small IPH, elevated troponin. Follows with Dr Edmond pena, recent ECHO EF 55-60%, Cath with CAD, no intervention, mod AS- non surgical at this time. admits to some chest pressure while at rest, self limiting, non radiating; also admits to fatigue with exertion.    CAD  s/p cath 6/19 RCA 80%- no intervention  Stable, denied anginal symptoms    Sinus Bradycardia asymptomatic  Pt is on betablocker,   Dose adjusted: metoprolol tartrate 12.5 milliGRAM(s) Oral daily  Cont telemetry monitoring    Moderate AS   TTE 11/6/19- EF 55-60%, normal LV systolic function

## 2019-11-22 LAB
ANION GAP SERPL CALC-SCNC: 12 MMOL/L — SIGNIFICANT CHANGE UP (ref 5–17)
BASOPHILS # BLD AUTO: 0.02 K/UL — SIGNIFICANT CHANGE UP (ref 0–0.2)
BASOPHILS NFR BLD AUTO: 0.4 % — SIGNIFICANT CHANGE UP (ref 0–2)
BUN SERPL-MCNC: 24 MG/DL — HIGH (ref 8–20)
CALCIUM SERPL-MCNC: 8.6 MG/DL — SIGNIFICANT CHANGE UP (ref 8.6–10.2)
CHLORIDE SERPL-SCNC: 101 MMOL/L — SIGNIFICANT CHANGE UP (ref 98–107)
CO2 SERPL-SCNC: 30 MMOL/L — HIGH (ref 22–29)
CREAT SERPL-MCNC: 0.87 MG/DL — SIGNIFICANT CHANGE UP (ref 0.5–1.3)
EOSINOPHIL # BLD AUTO: 0.1 K/UL — SIGNIFICANT CHANGE UP (ref 0–0.5)
EOSINOPHIL NFR BLD AUTO: 2.2 % — SIGNIFICANT CHANGE UP (ref 0–6)
GLUCOSE SERPL-MCNC: 87 MG/DL — SIGNIFICANT CHANGE UP (ref 70–115)
HCT VFR BLD CALC: 38.6 % — LOW (ref 39–50)
HGB BLD-MCNC: 12 G/DL — LOW (ref 13–17)
IMM GRANULOCYTES NFR BLD AUTO: 0.4 % — SIGNIFICANT CHANGE UP (ref 0–1.5)
LYMPHOCYTES # BLD AUTO: 1.37 K/UL — SIGNIFICANT CHANGE UP (ref 1–3.3)
LYMPHOCYTES # BLD AUTO: 30.8 % — SIGNIFICANT CHANGE UP (ref 13–44)
MAGNESIUM SERPL-MCNC: 1.8 MG/DL — SIGNIFICANT CHANGE UP (ref 1.8–2.6)
MCHC RBC-ENTMCNC: 28.5 PG — SIGNIFICANT CHANGE UP (ref 27–34)
MCHC RBC-ENTMCNC: 31.1 GM/DL — LOW (ref 32–36)
MCV RBC AUTO: 91.7 FL — SIGNIFICANT CHANGE UP (ref 80–100)
MONOCYTES # BLD AUTO: 0.45 K/UL — SIGNIFICANT CHANGE UP (ref 0–0.9)
MONOCYTES NFR BLD AUTO: 10.1 % — SIGNIFICANT CHANGE UP (ref 2–14)
NEUTROPHILS # BLD AUTO: 2.49 K/UL — SIGNIFICANT CHANGE UP (ref 1.8–7.4)
NEUTROPHILS NFR BLD AUTO: 56.1 % — SIGNIFICANT CHANGE UP (ref 43–77)
PHOSPHATE SERPL-MCNC: 3.8 MG/DL — SIGNIFICANT CHANGE UP (ref 2.4–4.7)
PLATELET # BLD AUTO: 93 K/UL — LOW (ref 150–400)
POTASSIUM SERPL-MCNC: 4.4 MMOL/L — SIGNIFICANT CHANGE UP (ref 3.5–5.3)
POTASSIUM SERPL-SCNC: 4.4 MMOL/L — SIGNIFICANT CHANGE UP (ref 3.5–5.3)
RBC # BLD: 4.21 M/UL — SIGNIFICANT CHANGE UP (ref 4.2–5.8)
RBC # FLD: 13.9 % — SIGNIFICANT CHANGE UP (ref 10.3–14.5)
SODIUM SERPL-SCNC: 143 MMOL/L — SIGNIFICANT CHANGE UP (ref 135–145)
WBC # BLD: 4.45 K/UL — SIGNIFICANT CHANGE UP (ref 3.8–10.5)
WBC # FLD AUTO: 4.45 K/UL — SIGNIFICANT CHANGE UP (ref 3.8–10.5)

## 2019-11-22 PROCEDURE — 99231 SBSQ HOSP IP/OBS SF/LOW 25: CPT

## 2019-11-22 RX ORDER — MAGNESIUM SULFATE 500 MG/ML
2 VIAL (ML) INJECTION ONCE
Refills: 0 | Status: COMPLETED | OUTPATIENT
Start: 2019-11-22 | End: 2019-11-22

## 2019-11-22 RX ADMIN — GABAPENTIN 100 MILLIGRAM(S): 400 CAPSULE ORAL at 12:14

## 2019-11-22 RX ADMIN — GABAPENTIN 100 MILLIGRAM(S): 400 CAPSULE ORAL at 05:10

## 2019-11-22 RX ADMIN — GABAPENTIN 100 MILLIGRAM(S): 400 CAPSULE ORAL at 22:25

## 2019-11-22 RX ADMIN — ENOXAPARIN SODIUM 40 MILLIGRAM(S): 100 INJECTION SUBCUTANEOUS at 12:14

## 2019-11-22 RX ADMIN — CELECOXIB 200 MILLIGRAM(S): 200 CAPSULE ORAL at 18:11

## 2019-11-22 RX ADMIN — CELECOXIB 200 MILLIGRAM(S): 200 CAPSULE ORAL at 05:46

## 2019-11-22 RX ADMIN — PANTOPRAZOLE SODIUM 40 MILLIGRAM(S): 20 TABLET, DELAYED RELEASE ORAL at 05:10

## 2019-11-22 RX ADMIN — ATORVASTATIN CALCIUM 10 MILLIGRAM(S): 80 TABLET, FILM COATED ORAL at 22:25

## 2019-11-22 RX ADMIN — CELECOXIB 200 MILLIGRAM(S): 200 CAPSULE ORAL at 05:10

## 2019-11-22 RX ADMIN — Medication 12.5 MILLIGRAM(S): at 05:11

## 2019-11-22 RX ADMIN — SENNA PLUS 2 TABLET(S): 8.6 TABLET ORAL at 22:25

## 2019-11-22 RX ADMIN — Medication 50 GRAM(S): at 12:14

## 2019-11-22 NOTE — PROGRESS NOTE ADULT - ASSESSMENT
87 yo M , presented s/p mechanical fall with mild TBI - small R SDH and R frontal IPH, ICH score 1. Etiology - amyloid vs traumatic vs underlying lesion.  Clinically and radiographically stable. CTA negative for vascular malformations. Downgraded today from the SICU    Plan:  -please, continue neurochecks q4 hrs  -please, keep Plt > 100,000 and INR < 1.4; received plts o/n for worsening thrombocytopenia  -pls, keep off ASA/NSAIds for 5-7 days post bleeding  - DVT ppx: LOv  - No Abx  - MRI results: Stable small right frontal lobe hemorrhage without abnormal   enhancement. Follow-up contrast enhanced MRI of the brain is recommended   post hemorrhage resolution.       DISPO pending clinical

## 2019-11-22 NOTE — PROGRESS NOTE ADULT - SUBJECTIVE AND OBJECTIVE BOX
HPI/OVERNIGHT EVENTS:    Patient examined at bed side, patient awake and oriented x3. Patient denies chest pain, SOB, n/v, f/c. Denies difficulty breathing or increase in pain. Pain is 5/10. IS is 1000cc, cough is medium strength. PIC SCORE: 8 (-1 for pain)     MEDICATIONS  (STANDING):  atorvastatin 10 milliGRAM(s) Oral at bedtime  celecoxib 200 milliGRAM(s) Oral two times a day  chlorhexidine 2% Cloths 1 Application(s) Topical daily  enoxaparin Injectable 40 milliGRAM(s) SubCutaneous daily  furosemide    Tablet 20 milliGRAM(s) Oral <User Schedule>  gabapentin 100 milliGRAM(s) Oral every 8 hours  lidocaine   Patch 1 Patch Transdermal every 24 hours  metoprolol tartrate 12.5 milliGRAM(s) Oral daily  pantoprazole    Tablet 40 milliGRAM(s) Oral before breakfast  senna 2 Tablet(s) Oral at bedtime    MEDICATIONS  (PRN):  acetaminophen   Tablet .. 650 milliGRAM(s) Oral every 6 hours PRN Mild Pain (1 - 3)  ondansetron Injectable 4 milliGRAM(s) IV Push every 4 hours PRN Nausea and/or Vomiting  oxyCODONE    IR 2.5 milliGRAM(s) Oral every 6 hours PRN Moderate Pain (4 - 6)  oxyCODONE    IR 5 milliGRAM(s) Oral every 6 hours PRN Severe Pain (7 - 10)  polyethylene glycol 3350 17 Gram(s) Oral daily PRN Constipation      Vital Signs Last 24 Hrs  T(C): 36.7 (21 Nov 2019 23:47), Max: 37.2 (21 Nov 2019 20:00)  T(F): 98.1 (21 Nov 2019 23:47), Max: 99 (21 Nov 2019 20:00)  HR: 64 (21 Nov 2019 23:47) (49 - 71)  BP: 109/65 (21 Nov 2019 23:47) (98/53 - 138/62)  BP(mean): 76 (21 Nov 2019 20:00) (73 - 89)  RR: 20 (21 Nov 2019 23:47) (17 - 46)  SpO2: 97% (21 Nov 2019 23:47) (94% - 100%)    Constitutional: patient resting comfortably in bed, in no acute distress  HEENT: EOMI / PERRL b/l, no active drainage or redness. Bruises in L maricruz face.   Neck: No JVD, full ROM without pain  Respiratory: respirations are unlabored, no accessory muscle use, no conversational dyspnea  Cardiovascular: regular rate & rhythm  Gastrointestinal: Abdomen soft, non-tender, non-distended, no rebound tenderness / guarding  Neurological: GCS: 15 (4/5/6). A&O x 3; no gross sensory / motor / coordination deficits  Psychiatric: Normal mood, normal affect  Musculoskeletal: No joint pain, swelling or deformity; no limitation of movement      I&O's Detail    20 Nov 2019 07:01  -  21 Nov 2019 07:00  --------------------------------------------------------  IN:    Oral Fluid: 1070 mL  Total IN: 1070 mL    OUT:    Voided: 975 mL  Total OUT: 975 mL    Total NET: 95 mL      21 Nov 2019 07:01  -  22 Nov 2019 02:28  --------------------------------------------------------  IN:  Total IN: 0 mL    OUT:    Voided: 875 mL  Total OUT: 875 mL    Total NET: -875 mL          LABS:                        11.1   4.88  )-----------( 86       ( 21 Nov 2019 03:29 )             36.7     11-21    140  |  101  |  24.0<H>  ----------------------------<  82  4.4   |  28.0  |  1.06    Ca    8.4<L>      21 Nov 2019 03:29  Phos  4.3     11-21  Mg     1.9     11-21

## 2019-11-22 NOTE — CHART NOTE - NSCHARTNOTEFT_GEN_A_CORE
Discussed case w/ Dr. Rodriguez.  MRI 11/21/19 read as stable.  MRI w/ and w/o reviewed w/ attending.   No neurosurgical intervention at this time.   F/U as out pt 2wks after discharge.   Continue care as per primary team.

## 2019-11-23 DIAGNOSIS — I60.9 NONTRAUMATIC SUBARACHNOID HEMORRHAGE, UNSPECIFIED: ICD-10-CM

## 2019-11-23 DIAGNOSIS — S06.5X9A TRAUMATIC SUBDURAL HEMORRHAGE WITH LOSS OF CONSCIOUSNESS OF UNSPECIFIED DURATION, INITIAL ENCOUNTER: ICD-10-CM

## 2019-11-23 LAB — TROPONIN T SERPL-MCNC: 0.28 NG/ML — HIGH (ref 0–0.06)

## 2019-11-23 PROCEDURE — 71045 X-RAY EXAM CHEST 1 VIEW: CPT | Mod: 26

## 2019-11-23 PROCEDURE — 93010 ELECTROCARDIOGRAM REPORT: CPT

## 2019-11-23 PROCEDURE — 99231 SBSQ HOSP IP/OBS SF/LOW 25: CPT | Mod: GC

## 2019-11-23 RX ADMIN — Medication 20 MILLIGRAM(S): at 08:46

## 2019-11-23 RX ADMIN — CELECOXIB 200 MILLIGRAM(S): 200 CAPSULE ORAL at 06:30

## 2019-11-23 RX ADMIN — Medication 12.5 MILLIGRAM(S): at 05:49

## 2019-11-23 RX ADMIN — GABAPENTIN 100 MILLIGRAM(S): 400 CAPSULE ORAL at 12:04

## 2019-11-23 RX ADMIN — SENNA PLUS 2 TABLET(S): 8.6 TABLET ORAL at 21:02

## 2019-11-23 RX ADMIN — CELECOXIB 200 MILLIGRAM(S): 200 CAPSULE ORAL at 05:49

## 2019-11-23 RX ADMIN — GABAPENTIN 100 MILLIGRAM(S): 400 CAPSULE ORAL at 05:49

## 2019-11-23 RX ADMIN — CELECOXIB 200 MILLIGRAM(S): 200 CAPSULE ORAL at 17:55

## 2019-11-23 RX ADMIN — PANTOPRAZOLE SODIUM 40 MILLIGRAM(S): 20 TABLET, DELAYED RELEASE ORAL at 05:49

## 2019-11-23 RX ADMIN — GABAPENTIN 100 MILLIGRAM(S): 400 CAPSULE ORAL at 21:01

## 2019-11-23 RX ADMIN — CELECOXIB 200 MILLIGRAM(S): 200 CAPSULE ORAL at 18:06

## 2019-11-23 RX ADMIN — ATORVASTATIN CALCIUM 10 MILLIGRAM(S): 80 TABLET, FILM COATED ORAL at 21:01

## 2019-11-23 RX ADMIN — LIDOCAINE 1 PATCH: 4 CREAM TOPICAL at 18:06

## 2019-11-23 RX ADMIN — ENOXAPARIN SODIUM 40 MILLIGRAM(S): 100 INJECTION SUBCUTANEOUS at 12:05

## 2019-11-23 RX ADMIN — LIDOCAINE 1 PATCH: 4 CREAM TOPICAL at 12:05

## 2019-11-23 NOTE — PROGRESS NOTE ADULT - ASSESSMENT
85 yo M , presented s/p mechanical fall with mild TBI - small R SDH and R frontal IPH, ICH score 1. Etiology - amyloid vs traumatic vs underlying lesion.  Clinically and radiographically stable. CTA negative for vascular malformations.    Plan:  -please, continue neurochecks q4 hrs  -please, keep Plt > 100,000 and INR < 1.4; received plts o/n for worsening thrombocytopenia  -pls, keep off ASA/NSAIds for 5-7 days post bleeding  - DVT ppx: LOv  - No Abx  -PT/OT for dispo evaluation

## 2019-11-23 NOTE — PROGRESS NOTE ADULT - SUBJECTIVE AND OBJECTIVE BOX
INTERVAL HPI/OVERNIGHT EVENTS: none    SUBJECTIVE: Patient evaluated at bedside and found in no acute distress. Patient remains on 2L nasal cannula with saturations in the 90's, but denies shortness of breath at present. Patient feeling comfortable and overall doing well. Patient alert and oriented x3. Pain is well controlled and rates it at 5/10 at present. Patient's PIC score currently 8. Patient tolerating diet and working with PT. He denies chest pain, SOB, n/v, fever/chills.     MEDICATIONS  (STANDING):  atorvastatin 10 milliGRAM(s) Oral at bedtime  celecoxib 200 milliGRAM(s) Oral two times a day  enoxaparin Injectable 40 milliGRAM(s) SubCutaneous daily  furosemide    Tablet 20 milliGRAM(s) Oral <User Schedule>  gabapentin 100 milliGRAM(s) Oral every 8 hours  lidocaine   Patch 1 Patch Transdermal every 24 hours  metoprolol tartrate 12.5 milliGRAM(s) Oral daily  pantoprazole    Tablet 40 milliGRAM(s) Oral before breakfast  senna 2 Tablet(s) Oral at bedtime    MEDICATIONS  (PRN):  acetaminophen   Tablet .. 650 milliGRAM(s) Oral every 6 hours PRN Mild Pain (1 - 3)  ondansetron Injectable 4 milliGRAM(s) IV Push every 4 hours PRN Nausea and/or Vomiting  oxyCODONE    IR 2.5 milliGRAM(s) Oral every 6 hours PRN Moderate Pain (4 - 6)  oxyCODONE    IR 5 milliGRAM(s) Oral every 6 hours PRN Severe Pain (7 - 10)  polyethylene glycol 3350 17 Gram(s) Oral daily PRN Constipation      Vital Signs Last 24 Hrs  T(C): 36.8 (23 Nov 2019 16:05), Max: 36.8 (23 Nov 2019 16:05)  T(F): 98.2 (23 Nov 2019 16:05), Max: 98.2 (23 Nov 2019 16:05)  HR: 54 (23 Nov 2019 18:44) (51 - 69)  BP: 145/69 (23 Nov 2019 18:44) (114/55 - 145/69)  BP(mean): --  RR: 18 (23 Nov 2019 18:44) (16 - 18)  SpO2: 95% (23 Nov 2019 18:44) (92% - 95%)  Constitutional: patient resting comfortably in bed, in no acute distress    PE  Gen: elderly gentleman resting comfortably in bed, no acute distress  Pulm: currently on 2L O2 via nasal canula and sating 92%. no increased wob, no conversational dyspnea  Abd: non-tender, non-distended  Neuro: A&Ox3. grossly motor and sensory intact   HEENT: old hematoma over R face      I&O's Detail      LABS:                        12.0   4.45  )-----------( 93       ( 22 Nov 2019 08:23 )             38.6     11-22    143  |  101  |  24.0<H>  ----------------------------<  87  4.4   |  30.0<H>  |  0.87    Ca    8.6      22 Nov 2019 08:23  Phos  3.8     11-22  Mg     1.8     11-22

## 2019-11-24 ENCOUNTER — TRANSCRIPTION ENCOUNTER (OUTPATIENT)
Age: 84
End: 2019-11-24

## 2019-11-24 VITALS
RESPIRATION RATE: 18 BRPM | HEART RATE: 60 BPM | TEMPERATURE: 98 F | DIASTOLIC BLOOD PRESSURE: 60 MMHG | SYSTOLIC BLOOD PRESSURE: 109 MMHG | OXYGEN SATURATION: 94 %

## 2019-11-24 LAB
ANION GAP SERPL CALC-SCNC: 8 MMOL/L — SIGNIFICANT CHANGE UP (ref 5–17)
BUN SERPL-MCNC: 19 MG/DL — SIGNIFICANT CHANGE UP (ref 8–20)
CALCIUM SERPL-MCNC: 8.7 MG/DL — SIGNIFICANT CHANGE UP (ref 8.6–10.2)
CHLORIDE SERPL-SCNC: 104 MMOL/L — SIGNIFICANT CHANGE UP (ref 98–107)
CO2 SERPL-SCNC: 30 MMOL/L — HIGH (ref 22–29)
CREAT SERPL-MCNC: 1.05 MG/DL — SIGNIFICANT CHANGE UP (ref 0.5–1.3)
GLUCOSE SERPL-MCNC: 96 MG/DL — SIGNIFICANT CHANGE UP (ref 70–115)
HCT VFR BLD CALC: 39.6 % — SIGNIFICANT CHANGE UP (ref 39–50)
HGB BLD-MCNC: 12.7 G/DL — LOW (ref 13–17)
MCHC RBC-ENTMCNC: 29.3 PG — SIGNIFICANT CHANGE UP (ref 27–34)
MCHC RBC-ENTMCNC: 32.1 GM/DL — SIGNIFICANT CHANGE UP (ref 32–36)
MCV RBC AUTO: 91.2 FL — SIGNIFICANT CHANGE UP (ref 80–100)
PLATELET # BLD AUTO: 102 K/UL — LOW (ref 150–400)
POTASSIUM SERPL-MCNC: 5.1 MMOL/L — SIGNIFICANT CHANGE UP (ref 3.5–5.3)
POTASSIUM SERPL-SCNC: 5.1 MMOL/L — SIGNIFICANT CHANGE UP (ref 3.5–5.3)
RBC # BLD: 4.34 M/UL — SIGNIFICANT CHANGE UP (ref 4.2–5.8)
RBC # FLD: 13.8 % — SIGNIFICANT CHANGE UP (ref 10.3–14.5)
SODIUM SERPL-SCNC: 142 MMOL/L — SIGNIFICANT CHANGE UP (ref 135–145)
WBC # BLD: 4.5 K/UL — SIGNIFICANT CHANGE UP (ref 3.8–10.5)
WBC # FLD AUTO: 4.5 K/UL — SIGNIFICANT CHANGE UP (ref 3.8–10.5)

## 2019-11-24 PROCEDURE — 85027 COMPLETE CBC AUTOMATED: CPT

## 2019-11-24 PROCEDURE — 92611 MOTION FLUOROSCOPY/SWALLOW: CPT

## 2019-11-24 PROCEDURE — 92610 EVALUATE SWALLOWING FUNCTION: CPT

## 2019-11-24 PROCEDURE — 80048 BASIC METABOLIC PNL TOTAL CA: CPT

## 2019-11-24 PROCEDURE — 97535 SELF CARE MNGMENT TRAINING: CPT

## 2019-11-24 PROCEDURE — 70553 MRI BRAIN STEM W/O & W/DYE: CPT

## 2019-11-24 PROCEDURE — 85610 PROTHROMBIN TIME: CPT

## 2019-11-24 PROCEDURE — 36600 WITHDRAWAL OF ARTERIAL BLOOD: CPT

## 2019-11-24 PROCEDURE — 84100 ASSAY OF PHOSPHORUS: CPT

## 2019-11-24 PROCEDURE — 97116 GAIT TRAINING THERAPY: CPT

## 2019-11-24 PROCEDURE — 70496 CT ANGIOGRAPHY HEAD: CPT

## 2019-11-24 PROCEDURE — 93005 ELECTROCARDIOGRAM TRACING: CPT

## 2019-11-24 PROCEDURE — 99231 SBSQ HOSP IP/OBS SF/LOW 25: CPT

## 2019-11-24 PROCEDURE — 82553 CREATINE MB FRACTION: CPT

## 2019-11-24 PROCEDURE — 97163 PT EVAL HIGH COMPLEX 45 MIN: CPT

## 2019-11-24 PROCEDURE — 85730 THROMBOPLASTIN TIME PARTIAL: CPT

## 2019-11-24 PROCEDURE — 84484 ASSAY OF TROPONIN QUANT: CPT

## 2019-11-24 PROCEDURE — 83605 ASSAY OF LACTIC ACID: CPT

## 2019-11-24 PROCEDURE — 84443 ASSAY THYROID STIM HORMONE: CPT

## 2019-11-24 PROCEDURE — 74230 X-RAY XM SWLNG FUNCJ C+: CPT

## 2019-11-24 PROCEDURE — 82550 ASSAY OF CK (CPK): CPT

## 2019-11-24 PROCEDURE — 36415 COLL VENOUS BLD VENIPUNCTURE: CPT

## 2019-11-24 PROCEDURE — 93306 TTE W/DOPPLER COMPLETE: CPT

## 2019-11-24 PROCEDURE — 99285 EMERGENCY DEPT VISIT HI MDM: CPT

## 2019-11-24 PROCEDURE — 70450 CT HEAD/BRAIN W/O DYE: CPT

## 2019-11-24 PROCEDURE — 80053 COMPREHEN METABOLIC PANEL: CPT

## 2019-11-24 PROCEDURE — 97530 THERAPEUTIC ACTIVITIES: CPT

## 2019-11-24 PROCEDURE — 84436 ASSAY OF TOTAL THYROXINE: CPT

## 2019-11-24 PROCEDURE — 83735 ASSAY OF MAGNESIUM: CPT

## 2019-11-24 PROCEDURE — 97167 OT EVAL HIGH COMPLEX 60 MIN: CPT

## 2019-11-24 PROCEDURE — 71045 X-RAY EXAM CHEST 1 VIEW: CPT

## 2019-11-24 RX ORDER — GABAPENTIN 400 MG/1
1 CAPSULE ORAL
Qty: 0 | Refills: 0 | DISCHARGE
Start: 2019-11-24

## 2019-11-24 RX ORDER — POLYETHYLENE GLYCOL 3350 17 G/17G
17 POWDER, FOR SOLUTION ORAL
Qty: 0 | Refills: 0 | DISCHARGE
Start: 2019-11-24

## 2019-11-24 RX ORDER — SENNA PLUS 8.6 MG/1
2 TABLET ORAL
Qty: 0 | Refills: 0 | DISCHARGE
Start: 2019-11-24

## 2019-11-24 RX ADMIN — PANTOPRAZOLE SODIUM 40 MILLIGRAM(S): 20 TABLET, DELAYED RELEASE ORAL at 05:59

## 2019-11-24 RX ADMIN — GABAPENTIN 100 MILLIGRAM(S): 400 CAPSULE ORAL at 12:58

## 2019-11-24 RX ADMIN — ENOXAPARIN SODIUM 40 MILLIGRAM(S): 100 INJECTION SUBCUTANEOUS at 12:46

## 2019-11-24 RX ADMIN — LIDOCAINE 1 PATCH: 4 CREAM TOPICAL at 00:00

## 2019-11-24 RX ADMIN — GABAPENTIN 100 MILLIGRAM(S): 400 CAPSULE ORAL at 05:59

## 2019-11-24 RX ADMIN — Medication 12.5 MILLIGRAM(S): at 05:59

## 2019-11-24 RX ADMIN — LIDOCAINE 1 PATCH: 4 CREAM TOPICAL at 12:46

## 2019-11-24 RX ADMIN — CELECOXIB 200 MILLIGRAM(S): 200 CAPSULE ORAL at 06:00

## 2019-11-24 NOTE — PROGRESS NOTE ADULT - SUBJECTIVE AND OBJECTIVE BOX
HPI/OVERNIGHT EVENTS:    Pt HDS, examined at bedside. Stable overnight. Pt complained during the evening of a chest pain, non radiating, non related with exercise. Pain was reproducible by pressing on his right esterno-condro joints and ribs. No SOB, dyspnea, diaphoresis, sweating, L arm pain, nausea. Pt states this pain has happened before with no consequences he is being followed up by his PCP who recently did an TTE and was normal.  EKG and tropos ordered. Both normal. CXR from this morning unchanged with previous one.       MEDICATIONS  (STANDING):  atorvastatin 10 milliGRAM(s) Oral at bedtime  celecoxib 200 milliGRAM(s) Oral two times a day  enoxaparin Injectable 40 milliGRAM(s) SubCutaneous daily  furosemide    Tablet 20 milliGRAM(s) Oral <User Schedule>  gabapentin 100 milliGRAM(s) Oral every 8 hours  lidocaine   Patch 1 Patch Transdermal every 24 hours  metoprolol tartrate 12.5 milliGRAM(s) Oral daily  pantoprazole    Tablet 40 milliGRAM(s) Oral before breakfast  senna 2 Tablet(s) Oral at bedtime    MEDICATIONS  (PRN):  acetaminophen   Tablet .. 650 milliGRAM(s) Oral every 6 hours PRN Mild Pain (1 - 3)  ondansetron Injectable 4 milliGRAM(s) IV Push every 4 hours PRN Nausea and/or Vomiting  oxyCODONE    IR 2.5 milliGRAM(s) Oral every 6 hours PRN Moderate Pain (4 - 6)  oxyCODONE    IR 5 milliGRAM(s) Oral every 6 hours PRN Severe Pain (7 - 10)  polyethylene glycol 3350 17 Gram(s) Oral daily PRN Constipation      Vital Signs Last 24 Hrs  T(C): 36.4 (23 Nov 2019 23:40), Max: 36.8 (23 Nov 2019 16:05)  T(F): 97.6 (23 Nov 2019 23:40), Max: 98.2 (23 Nov 2019 16:05)  HR: 50 (23 Nov 2019 23:40) (50 - 69)  BP: 130/62 (23 Nov 2019 23:40) (114/55 - 145/69)  BP(mean): --  RR: 18 (23 Nov 2019 23:40) (16 - 18)  SpO2: 93% (23 Nov 2019 23:40) (92% - 95%)    Constitutional: patient resting comfortably in bed, in no acute distress  HEENT: EOMI / PERRL b/l, no active drainage or redness  Neck: No JVD, full ROM without pain  Respiratory: respirations are unlabored, no accessory muscle use, no conversational dyspnea  Cardiovascular: regular rate & rhythm  Gastrointestinal: Abdomen soft, non-tender, non-distended, no rebound tenderness / guarding  Neurological: GCS: 15 (4/5/6). A&O x 3; no gross sensory / motor / coordination deficits  Psychiatric: Normal mood, normal affect  Musculoskeletal: No joint pain, swelling or deformity; no limitation of movement      I&O's Detail      LABS:                        12.0   4.45  )-----------( 93       ( 22 Nov 2019 08:23 )             38.6     11-22    143  |  101  |  24.0<H>  ----------------------------<  87  4.4   |  30.0<H>  |  0.87    Ca    8.6      22 Nov 2019 08:23  Phos  3.8     11-22  Mg     1.8     11-22

## 2019-11-24 NOTE — PROGRESS NOTE ADULT - ASSESSMENT
87 yo M , presented s/p mechanical fall with mild TBI - small R SDH and R frontal IPH, ICH score 1. Etiology - amyloid vs traumatic vs underlying lesion.  Clinically and radiographically stable. CTA negative for vascular malformations. Stable overnight. Pt complained during the evening of a chest pain, non radiating, non related with exercise. Pain was reproducible by pressing on his right esterno-condro joints and ribs. No SOB, dyspnea, diaphoresis, sweating, L arm pain, nausea. Pt states this pain has happened before with no consequences he is being followed up by his PCP who recently did an TTE and was normal.  EKG and tropos ordered. Both normal. CXR from this morning unchanged with previous one.     Plan:  -please, continue neurochecks q4 hrs  -please, keep Plt > 100,000 and INR < 1.4; received plts o/n for worsening thrombocytopenia  -pls, keep off ASA/NSAIds for 5-7 days post bleeding  - DVT ppx: LOv  - No Abx  -PT/OT for dispo evaluation  - Neurosurgery cleared for discharge.     DISPO: waiting for PT home set up. Then Home.

## 2019-11-24 NOTE — DISCHARGE NOTE PROVIDER - CARE PROVIDER_API CALL
Chris Rodriguez)  Neurosurgery  [facility], [address] [place] [address]  Phone: [phone]  Fax: [phone]  Follow Up Time:

## 2019-11-24 NOTE — DISCHARGE NOTE NURSING/CASE MANAGEMENT/SOCIAL WORK - NSDCPEPTSTRK_GEN_ALL_CORE
Risk factors for stroke/Stroke support groups for patients, families, and friends/Need for follow up after discharge/Prescribed medications/Stroke education booklet/Stroke warning signs and symptoms/Signs and symptoms of stroke/Call 911 for stroke

## 2019-11-24 NOTE — PROGRESS NOTE ADULT - REASON FOR ADMISSION
Transfer for SDH, IPH, elevated troponin

## 2019-11-24 NOTE — DISCHARGE NOTE PROVIDER - NSDCMRMEDTOKEN_GEN_ALL_CORE_FT
aspirin 81 mg oral tablet: 1 tab(s) orally once a day  Lasix 40 mg oral tablet: 1 tab(s) orally once a day  Lipitor 10 mg oral tablet: 1 tab(s) orally once a day  metoprolol succinate 25 mg oral tablet, extended release: 1 tab(s) orally once a day  omeprazole 40 mg oral delayed release capsule: 1 cap(s) orally once a day  potassium chloride 10 mEq oral capsule, extended release: orally once a day aspirin 81 mg oral tablet: 1 tab(s) orally once a day  gabapentin 100 mg oral capsule: 1 cap(s) orally every 8 hours  Lasix 40 mg oral tablet: 1 tab(s) orally once a day  Lipitor 10 mg oral tablet: 1 tab(s) orally once a day  metoprolol succinate 25 mg oral tablet, extended release: 1 tab(s) orally once a day  omeprazole 40 mg oral delayed release capsule: 1 cap(s) orally once a day  polyethylene glycol 3350 oral powder for reconstitution: 17 gram(s) orally once a day, As needed, Constipation  potassium chloride 10 mEq oral capsule, extended release: orally once a day  senna oral tablet: 2 tab(s) orally once a day (at bedtime)

## 2019-11-24 NOTE — PROGRESS NOTE ADULT - NSHPATTENDINGPLANDISCUSS_GEN_ALL_CORE
MICU, NSGy team.
Patient, ACS team, all questions answered
Patient, ACS team, all questions answered

## 2019-11-24 NOTE — PROGRESS NOTE ADULT - ATTENDING COMMENTS
I have seen and examined [facility] patient.  neuro intact.  HD normal  Pt eval  appreciate cardiology input  dipso planning
I have seen and examined the patient.   no new clinical issues  awaiting rehab placement  dvt chemoprophylaxes    PT
Seen and examined.    NAD  AAOx4, non focal  RRR  non labored resp.      Stable neuro exam.  Decrease neuro checks.  q2 today, q4 for sleep.  Plan for MRI brain to exclude underlying mass with contusion as is well circumscribed.  Bradycardia and borderline hypotension overnight.  Split B blocker dose.  F/U trop this AM given NSTEMI.  Bedside ultrasound with grossly normal LV function.  IVC with minimal resp variability.  No orthostatic hypotension.  MBS today for dysphagia which is preexisting from fall.  Will cont in ICU and postpone MRI to monitor cardiac rhythm.
NSGY Attg:    see above     patient seen and examined    agree with exam as above    MRI reviewed -- stable ICH without contrast enhancement    agree with plan as above
Seen and examined.     NAD  AAox4,non focal  Regular mild bradycardia  nonlabored resp    Has remained neuro stable.  thrombocytopenia which was present on admission slight worse today.  Transfuse platelets given ICH, goal to 100K.  Bradycardia and stable asymptotic borderline hypotension overnight.  Neg orthostatics.  Trops downtrending.  Decrease lopressor.  Rib pain improved.  Good IS.  MRI brain today.  If hemorrhage stable downgrade to monitored bed. [employer]e  PT/OT/PMR.
DINORAH Attg:    see above    patient seen and examined    Exam:  A and O x 3  PERRL  EOMI  FS TML  LEI to command  no focal motor deficit    CT reviewed    agree with plan as above
Agree with above assessment.  The patient was seen and examined by me.  The patient is without headache or dizziness.  He states he is feeling well overall except mild pain along the right chest wall.  The patient is with resolving ecchymosis of the right periorbital area.  The patient is neuro stable.  Discharge planning.

## 2019-11-24 NOTE — DISCHARGE NOTE NURSING/CASE MANAGEMENT/SOCIAL WORK - PATIENT PORTAL LINK FT
You can access the FollowMyHealth Patient Portal offered by [facility] by registering at the following website: [email or web address]. By joining [employer] [facility]’s FollowMyHealth portal, you will also be able to view your health information using other applications (apps) compatible with our system.

## 2019-11-24 NOTE — DISCHARGE NOTE PROVIDER - NSDCCPCAREPLAN_GEN_ALL_CORE_FT
PRINCIPAL DISCHARGE DIAGNOSIS  Diagnosis: Subdural hematoma  Assessment and Plan of Treatment: ACTIVITY: No heavy lifting or straining. Otherwise, you may return to your usual level of physical activity. If you are taking narcotic pain medication (such as oxycodone) DO NOT drive a car, operate machinery or make important decisions.  DIET: Return to your usual diet.  NOTIFY YOUR SURGEON IF: You have any bleeding that does not stop, any pus draining from your wound(s), chest pain, shortness of breath, high/low output from ostomy, any fever (over 100.4 F) or chills, persistent nausea/vomiting, persistent diarrhea, concerning symptoms, or if your pain is not controlled on your discharge pain medications.  FOLLOW-UP: Please follow up with trauma service in 1- 2 weeks regarding your hospitalization. Call for appointment upon discharge.   A prescription for oxycodone has been sent to your pharmacy. You are encouraged to take tylenol or ibuprofen for mild-moderate pain.  Resume Aspirin and NSAID pain medications starting 11/26/19        SECONDARY DISCHARGE DIAGNOSES  Diagnosis: Subarachnoid bleed  Assessment and Plan of Treatment:

## 2019-11-24 NOTE — DISCHARGE NOTE PROVIDER - HOSPITAL COURSE
Transfer from [place] for admission to the ICU under the care of the SICU team.    HPI: Patient was walking on the street earlier today, states he did not lose consciousness but fell over on his right side striking his head and his right chest wall on concrete. Laceration on right eyebrow repaired at [place]. At [facility] he was found to have a SDH, a small IPH, and an elevated troponin of .798. Here patient c/o slight HA on the right side and pain in his right chest wall. Transfer from [place] for admission to the ICU under the care of the SICU team.    HPI: Patient was walking on the street earlier today, states he did not lose consciousness but fell over on his right side striking his head and his right chest wall on concrete. Laceration on right eyebrow repaired at [place]. At [facility] he was found to have a SDH, a small IPH, and an elevated troponin of .798. Here patient c/o slight HA on the right side and pain in his right chest wall. Patient seen by Cardiology and reviewed to have had a TTE on 11/6/19 with EF: 55-60% normal LB systolic function, Cath perfumed on june showed RCA 80% without need for intervention. Elevated troponins to related head injury, stress response, non-ischemic. CTA and MRI of his head showed no vascular malformations, no underlying lesions. Neurosurgery recommending holding of ASA/NSAIDs for 5-7 days following the acute bleed. Patient's Subdural hematoma found to be stalbe. Patient evaluated by PT/OT recommending home PT. Will follow up with his cardiologist, neurosurgery in 1-2 weeks, and with the trauma team in 1-2weeks. Patient tolerating diet and has no other acute issues at this time. pain is well controlled and he can resume his home medications with the exception of ASA/NSAID's.

## 2019-11-24 NOTE — DISCHARGE NOTE NURSING/CASE MANAGEMENT/SOCIAL WORK - NSDCVIVACCINE_GEN_ALL_CORE_FT
Tdap , 2016/9/10 10:40 , Winifred Talavera (RN)  Anatoliy , 2019/11/18 20:11 , Daphnie Vitale (KRYSTA)

## 2019-11-24 NOTE — DISCHARGE NOTE PROVIDER - NSFOLLOWUPCLINICS_GEN_ALL_ED_FT
[facility] Trauma Surgery  Trauma Surgery  [address] [place] [address]  Phone: [phone]  Fax:   Follow Up Time:

## 2019-11-25 ENCOUNTER — INBOUND DOCUMENT (OUTPATIENT)
Age: 84
End: 2019-11-25

## 2019-12-10 ENCOUNTER — APPOINTMENT (OUTPATIENT)
Dept: NEUROSURGERY | Facility: CLINIC | Age: 84
End: 2019-12-10
Payer: MEDICARE

## 2019-12-10 VITALS
TEMPERATURE: 98.6 F | WEIGHT: 202 LBS | BODY MASS INDEX: 31.71 KG/M2 | SYSTOLIC BLOOD PRESSURE: 118 MMHG | OXYGEN SATURATION: 93 % | HEIGHT: 67 IN | HEART RATE: 60 BPM | DIASTOLIC BLOOD PRESSURE: 56 MMHG

## 2019-12-10 DIAGNOSIS — Z86.79 PERSONAL HISTORY OF OTHER DISEASES OF THE CIRCULATORY SYSTEM: ICD-10-CM

## 2019-12-10 PROCEDURE — 99213 OFFICE O/P EST LOW 20 MIN: CPT

## 2019-12-10 RX ORDER — ATORVASTATIN CALCIUM 10 MG/1
10 TABLET, FILM COATED ORAL
Refills: 0 | Status: ACTIVE | COMMUNITY

## 2019-12-10 RX ORDER — METOPROLOL SUCCINATE 25 MG/1
25 TABLET, EXTENDED RELEASE ORAL
Refills: 0 | Status: ACTIVE | COMMUNITY

## 2019-12-10 NOTE — REVIEW OF SYSTEMS
[As Noted in HPI] : as noted in HPI [Tension Headache] : tension-type headaches [Negative] : Heme/Lymph [Seizures] : no convulsions [Numbness] : no numbness [Tingling] : no tingling [Dizziness] : no dizziness [Difficulty Walking] : no difficulty walking

## 2019-12-10 NOTE — HISTORY OF PRESENT ILLNESS
[FreeTextEntry1] : s/p intracranial hemorrhage.  [de-identified] : Mr. Boothe presents for follow up visit/post hospitalization [facility] 11/14 - 11/24/2019.  Patient was walking on the street earlier today, states he did not lose consciousness but fell over on his right side striking his head and his right chest wall on concrete. Laceration on right eyebrow repaired at [place]. At [facility] he was found to have a SDH, a small IPH.  CTA and MRI of his head showed no vascular malformations, no underlying lesions. At today's visit, he denies any headaches, n/v or visual disturbances.  He has not had any seizures.  Denies any numbness/tingling or weakness of extremities.  No balance issues.  Denies any fever or chills.  Patient has resumed his aspirin 81 mg daily.

## 2019-12-10 NOTE — DATA REVIEWED
[de-identified] :  Stable small right frontal lobe hemorrhage without abnormal  enhancement. Follow-up contrast enhanced MRI of the brain is recommended  post hemorrhage resolution.

## 2019-12-10 NOTE — PHYSICAL EXAM
[General Appearance - Alert] : alert [General Appearance - In No Acute Distress] : in no acute distress [General Appearance - Well Developed] : well developed [General Appearance - Well Nourished] : well nourished [Oriented To Time, Place, And Person] : oriented to person, place, and time [Affect] : the affect was normal [Impaired Insight] : insight and judgment were intact [Mood] : the mood was normal [Person] : oriented to person [Place] : oriented to place [Time] : oriented to time [Short Term Intact] : short term memory intact [Fluency] : fluency intact [Concentration Intact] : normal concentrating ability [Comprehension] : comprehension intact [Cranial Nerves Oculomotor (III)] : extraocular motion intact [Cranial Nerves Trigeminal (V)] : facial sensation intact symmetrically [Cranial Nerves Optic (II)] : visual acuity intact bilaterally,  pupils equal round and reactive to light [Cranial Nerves Accessory (XI - Cranial And Spinal)] : head turning and shoulder shrug symmetric [Cranial Nerves Glossopharyngeal (IX)] : tongue and palate midline [Cranial Nerves Facial (VII)] : face symmetrical [Cranial Nerves Hypoglossal (XII)] : there was no tongue deviation with protrusion [Motor Strength] : muscle strength was normal in all four extremities [Motor Tone] : muscle tone was normal in all four extremities [Involuntary Movements] : no involuntary movements were seen [No Muscle Atrophy] : normal bulk in all four extremities [Sensation Tactile Decrease] : light touch was intact [Sensation Pain / Temperature Decrease] : pain and temperature was intact [Abnormal Walk] : normal gait [Balance] : balance was intact [Normal] : normal [No Visual Abnormalities] : no visible abnormailities [Able to toe walk] : the patient was able to toe walk [Able to heel walk] : the patient was able to heel walk [Over the Past 2 Weeks, Have You Felt Down, Depressed, or Hopeless?] : 1.) Over the past 2 weeks, have you felt down, depressed, or hopeless? No [Over the Past 2 Weeks, Have You Felt Little Interest or Pleasure Doing Things?] : 2.) Over the past 2 weeks, have you felt little interest or pleasure doing things? No

## 2019-12-10 NOTE — ASSESSMENT
[FreeTextEntry1] : Mr. Boothe presents with above history and imaging.  He is neurologically intact at today's visit and offers no complaints.  Patient will obtain an CT head w/wo contrast to further assess the interval resolution of his right frontal intracranial hemorrhage. Patient is not amenable to obtaining MRI.  He should follow up after imaging.  He knows to call the office if there are any new or worsening symptoms.

## 2019-12-10 NOTE — REASON FOR VISIT
[New Patient Visit] : a new patient visit [Family Member] : family member [FreeTextEntry1] : right frontal intracranial hemorrhage.

## 2019-12-17 ENCOUNTER — OUTPATIENT (OUTPATIENT)
Dept: OUTPATIENT SERVICES | Facility: HOSPITAL | Age: 84
LOS: 1 days | End: 2019-12-17
Payer: MEDICARE

## 2019-12-17 ENCOUNTER — APPOINTMENT (OUTPATIENT)
Dept: CT IMAGING | Facility: CLINIC | Age: 84
End: 2019-12-17

## 2019-12-17 DIAGNOSIS — I62.9 NONTRAUMATIC INTRACRANIAL HEMORRHAGE, UNSPECIFIED: ICD-10-CM

## 2019-12-17 PROCEDURE — 82565 ASSAY OF CREATININE: CPT

## 2019-12-17 PROCEDURE — 70470 CT HEAD/BRAIN W/O & W/DYE: CPT | Mod: 26

## 2019-12-17 PROCEDURE — 70470 CT HEAD/BRAIN W/O & W/DYE: CPT

## 2020-02-25 NOTE — ED ADULT NURSE NOTE - RESPIRATION RHYTHM, QM
[facility] Medicine  Progress Note    Patient Name: Shara Hutchins  MRN: [id number]  Patient Class: IP- Inpatient   Admission Date: 2/23/2020  Length of Stay: 0 days  Attending Physician: Aleksander Phan MD  Primary Care Provider: April Horton MD        Subjective:     Principal Problem:Severe dehydration    Interval History:      Admitted with dehydration and high output colostomy after bowel surgery possible short gut syndrome  Patient appeared to be comfortable, soft abdomen but still having large amount of output from the colostomy but start having slightly formed stool.  Renal function slightly worsened, anemia stable. alkalosis noted. C diff negative 2 times  Review of Systems  Objective:     Vital Signs (Most Recent):  Temp: 97.8 °F (36.6 °C) (02/25/20 1515)  Pulse: 63 (02/25/20 1515)  Resp: 16 (02/25/20 1515)  BP: 114/62 (02/25/20 1515)  SpO2: 95 % (02/25/20 1515) Vital Signs (24h Range):  Temp:  [97.8 °F (36.6 °C)-98.1 °F (36.7 °C)] 97.8 °F (36.6 °C)  Pulse:  [56-75] 63  Resp:  [8-20] 16  SpO2:  [91 %-100 %] 95 %  BP: ()/(48-80) 114/62     Weight: 52.4 kg (115 lb 8.3 oz)  Body mass index is 18.09 kg/m².    Intake/Output Summary (Last 24 hours) at 2/25/2020 1528  Last data filed at 2/25/2020 1300  Gross per 24 hour   Intake 5361.25 ml   Output 7300 ml   Net -1938.75 ml      Physical Exam  Physical Exam:  General- Patient alert and oriented x3 in NAD  HEENT- PERRLA, EOMI, OP clear, MMM  Neck- No JVD, Lymphadenopathy, Thyromegaly  CV- Regular rate and rhythm, No Murmur/jackie/rubs  Resp- Lungs CTA Bilaterally, No increased WOB  GI- Non tender BS normoactive x4 quads  Extrem- No cyanosis, clubbing, edema  Neuro- Strength 5/5 flexors/extensors,Skin-  No masses, rashes or lesions noted on cursory skin exam.    Overview/Hospital Course:     Significant Labs:   CBC:   Recent Labs   Lab 02/23/20  1530 02/24/20  0350 02/25/20  0325   WBC 6.26 8.67 6.62   HGB 10.2* 8.2* 8.1*   HCT  33.8* 26.3* 26.2*    198 175     CMP:   Recent Labs   Lab 02/23/20  1530 02/24/20  0350 02/25/20  0325    139 141   K 3.9 3.6 3.3*   CL 79* 85* 87*   CO2 40* 41* 41*    96 78   BUN 36* 33* 37*   CREATININE 3.9* 3.8* 4.5*   CALCIUM 9.0 8.1* 8.4*   PROT 6.1 4.7*  --    ALBUMIN 3.7 2.9*  --    BILITOT 1.9* 1.6*  --    ALKPHOS 231* 168*  --    AST 35 25  --    ALT 25 20  --    ANIONGAP 25* 13 13   EGFRNONAA 11.1* 11.5* 9.4*       Significant Imaging: I have reviewed all pertinent imaging results/findings within the past 24 hours.    Assessment/Plan:      Severe dehydration with hypotension  secondary to High output iliostomy;   Also possible Due to chemoradiation therapy induced chronic diarrhea worsened after ileostomy.     increase  Ringer lactate at 100 cc/hour for now  Loperamide 4mg QID.   protonix and magnesium oxide  Dietitian consulted  Continue cholestyramine   downgrade     Malnutrition with  weight loss of 20 lb in 1 week  Patient may need TPN     Lactic Acidosis secondary to dehydration , no sign of infection -resolved.C diff and blood cultures neg  Continue  antibiotics  given the cause could be intra-abdominal sepsis, enteritis    DARCI on CKD V:   Continue IV fluids.   Avoid nephrotoxins  Avoid NSAID, Ace inhibitors, arbs  Renal dose all medications      Vulvar cancer with skin excoriation due to XRT   wound care       Hypothyroidism:   continue Levothyroxine.     Anemia   Monitor and may need transfusion soon     Deconditioning:   Hypomagnesemia-resolved   DNR status        ________________________________________________________________________________          Active Diagnoses:    Diagnosis Date Noted POA    PRINCIPAL PROBLEM:  Severe dehydration due to chronic diarrhea/high output ileostomy [E86.0] 02/24/2020 Yes    DNR (do not resuscitate) [Z66] 02/24/2020 Yes    Electrolyte imbalance [E87.8] 02/24/2020 Yes    Severe malnutrition [E43] 02/24/2020 Yes    DARCI (acute kidney  injury) [N17.9] 02/23/2020 Yes      Problems Resolved During this Admission:     VTE Risk Mitigation (From admission, onward)         Ordered     IP VTE HIGH RISK PATIENT  Once      02/23/20 2025     Reason for No Pharmacological VTE Prophylaxis  Once     Question:  Reasons:  Answer:  Risk of Bleeding    02/23/20 2025                   Aleksander Phna MD  Department of Hospital Medicine   [facility]   regular

## 2020-06-25 ENCOUNTER — APPOINTMENT (OUTPATIENT)
Dept: SURGICAL ONCOLOGY | Facility: CLINIC | Age: 85
End: 2020-06-25
Payer: MEDICARE

## 2020-06-25 VITALS
SYSTOLIC BLOOD PRESSURE: 136 MMHG | OXYGEN SATURATION: 93 % | DIASTOLIC BLOOD PRESSURE: 83 MMHG | HEIGHT: 67 IN | BODY MASS INDEX: 32.96 KG/M2 | HEART RATE: 86 BPM | WEIGHT: 210 LBS

## 2020-06-25 VITALS — TEMPERATURE: 98.4 F

## 2020-06-25 PROCEDURE — 99205 OFFICE O/P NEW HI 60 MIN: CPT

## 2020-06-25 NOTE — ASSESSMENT
[FreeTextEntry1] : Mr. Boothe is a 85 y/o male with at least T1a on his left medial/lateral shoulder. He was seen by his dermatologist on 6/18/20 for a biopsy of his left shoulder. Pathology report revealed T1a malignant melanoma measuring at least .50 mm in thickness. \par \par I had a long discussion with the pt regarding his diagnosis, prognosis and all management options.\par Procedure discussed in detail, wide excision with 1-2 cm margins. Need for a larger for a elliptical excision. Disparity between lesion size size and excision explained to patient. In addition we discussed that while T1a melanoma typically does not warrant routine sentinel lymph node biopsy, in his case I believe sentinel node biopsy IS warranted. His lesion is considerable in size and grossly looks suspicious for >T1a disease, and in addition his biopsy had tumor at the deep margin.\par \par All questions/concerns addressed. \par \par Recommend that he get a cardiology evaluation/clearance prior to surgery. \par Also recommend that he be evaluated by a dermatologist for a full body examination following surgery

## 2020-06-25 NOTE — REASON FOR VISIT
[Initial Consultation] : an initial consultation for [Melanoma] : melanoma [Referred By: ___] : Referred By: [unfilled] [Family Member] : family member

## 2020-06-25 NOTE — PHYSICAL EXAM
[Normal] : supple, no neck mass and thyroid not enlarged [Normal Neck Lymph Nodes] : normal neck lymph nodes  [Normal Groin Lymph Nodes] : normal groin lymph nodes [Normal Supraclavicular Lymph Nodes] : normal supraclavicular lymph nodes [Normal Axillary Lymph Nodes] : normal axillary lymph nodes [Normal] : grossly intact [de-identified] : no cervical adenopathy [de-identified] : no axillary adenopathy [de-identified] : Numerous pigmented lesions and seborrheic keratosis throughout body, none suspicious. Left shoulder raised pigmented lesion over acromion 2.5cm by 2.5cm, suspicious for greater than T1a disease.

## 2020-06-25 NOTE — HISTORY OF PRESENT ILLNESS
[de-identified] : Larry Boothe is an 86 year old male who presents today for initial consultation for left shoulder melanoma. He reports a lesion on his left shoulder for a "couple of months" that was not healing.  Reports he was self treating with bacitracin. He went to his dermatologist in [place] who performed a biopsy of the lesion. Dermatopathology report from 6/18/2020 revealed malignant melanoma on the left medial/lateral shoulder. Has been using AquaForm as per his dermatologist. \par Today pt is w/o any complaints. Denies any new skin lesions, changes, pruritic moles. States he takes a baby aspirin 81mg daily. \par \par \par Dermatopathology Report 6/18/20: \par Left Shoulder Medial: Malignant melanoma measuring at least 0.5 mm in thickness. Level III T1a at least\par Left Shoulder Lateral: Malignant melanoma measuring at least 0.45mm in thickness. Level II T1a at least

## 2020-06-25 NOTE — ADDENDUM
[FreeTextEntry1] : I, Keara Pearson, acted soley as a scribe for Dr. Mustapha Monique on this date 06/25/2020.\par

## 2020-07-10 NOTE — ED PROVIDER NOTE - NS_EDPROVIDERDISPOUSERTYPE_ED_A_ED
Scribe Attestation (For Scribes USE Only)... Attending Attestation (For Attendings USE Only).../Scribe Attestation (For Scribes USE Only).../Medical Students Attestation (For Medical Student USE Only)... denies

## 2020-07-28 ENCOUNTER — APPOINTMENT (OUTPATIENT)
Dept: SURGICAL ONCOLOGY | Facility: HOSPITAL | Age: 85
End: 2020-07-28

## 2020-07-28 ENCOUNTER — APPOINTMENT (OUTPATIENT)
Dept: NUCLEAR MEDICINE | Facility: HOSPITAL | Age: 85
End: 2020-07-28

## 2020-07-31 ENCOUNTER — RESULT REVIEW (OUTPATIENT)
Age: 85
End: 2020-07-31

## 2020-07-31 ENCOUNTER — OUTPATIENT (OUTPATIENT)
Dept: OUTPATIENT SERVICES | Facility: HOSPITAL | Age: 85
LOS: 1 days | End: 2020-07-31

## 2020-07-31 DIAGNOSIS — C80.1 MALIGNANT (PRIMARY) NEOPLASM, UNSPECIFIED: ICD-10-CM

## 2020-08-04 ENCOUNTER — OUTPATIENT (OUTPATIENT)
Dept: OUTPATIENT SERVICES | Facility: HOSPITAL | Age: 85
LOS: 1 days | End: 2020-08-04
Payer: MEDICARE

## 2020-08-04 VITALS
DIASTOLIC BLOOD PRESSURE: 70 MMHG | HEART RATE: 67 BPM | HEIGHT: 62 IN | SYSTOLIC BLOOD PRESSURE: 100 MMHG | RESPIRATION RATE: 16 BRPM | WEIGHT: 188.05 LBS | OXYGEN SATURATION: 99 % | TEMPERATURE: 99 F

## 2020-08-04 DIAGNOSIS — C43.62 MALIGNANT MELANOMA OF LEFT UPPER LIMB, INCLUDING SHOULDER: ICD-10-CM

## 2020-08-04 DIAGNOSIS — Z98.890 OTHER SPECIFIED POSTPROCEDURAL STATES: Chronic | ICD-10-CM

## 2020-08-04 DIAGNOSIS — Z90.49 ACQUIRED ABSENCE OF OTHER SPECIFIED PARTS OF DIGESTIVE TRACT: Chronic | ICD-10-CM

## 2020-08-04 LAB
ANION GAP SERPL CALC-SCNC: 13 MMO/L — SIGNIFICANT CHANGE UP (ref 7–14)
ANISOCYTOSIS BLD QL: SLIGHT — SIGNIFICANT CHANGE UP
BLD GP AB SCN SERPL QL: POSITIVE — SIGNIFICANT CHANGE UP
BUN SERPL-MCNC: 20 MG/DL — SIGNIFICANT CHANGE UP (ref 7–23)
CALCIUM SERPL-MCNC: 8.7 MG/DL — SIGNIFICANT CHANGE UP (ref 8.4–10.5)
CHLORIDE SERPL-SCNC: 105 MMOL/L — SIGNIFICANT CHANGE UP (ref 98–107)
CO2 SERPL-SCNC: 24 MMOL/L — SIGNIFICANT CHANGE UP (ref 22–31)
CREAT SERPL-MCNC: 0.88 MG/DL — SIGNIFICANT CHANGE UP (ref 0.5–1.3)
DAT C3-SP REAG RBC QL: POSITIVE — SIGNIFICANT CHANGE UP
DAT POLY-SP REAG RBC QL: POSITIVE — SIGNIFICANT CHANGE UP
DIRECT COOMBS IGG: NEGATIVE — SIGNIFICANT CHANGE UP
GLUCOSE SERPL-MCNC: 87 MG/DL — SIGNIFICANT CHANGE UP (ref 70–99)
HCT VFR BLD CALC: 44.7 % — SIGNIFICANT CHANGE UP (ref 39–50)
HGB BLD-MCNC: 14.2 G/DL — SIGNIFICANT CHANGE UP (ref 13–17)
MACROCYTES BLD QL: SLIGHT — SIGNIFICANT CHANGE UP
MCHC RBC-ENTMCNC: 28.9 PG — SIGNIFICANT CHANGE UP (ref 27–34)
MCHC RBC-ENTMCNC: 31.8 % — LOW (ref 32–36)
MCV RBC AUTO: 91 FL — SIGNIFICANT CHANGE UP (ref 80–100)
NRBC # FLD: 0 K/UL — SIGNIFICANT CHANGE UP (ref 0–0)
OVALOCYTES BLD QL SMEAR: SLIGHT — SIGNIFICANT CHANGE UP
PLATELET # BLD AUTO: 106 K/UL — LOW (ref 150–400)
PLATELET COUNT - ESTIMATE: SIGNIFICANT CHANGE UP
PMV BLD: 10.5 FL — SIGNIFICANT CHANGE UP (ref 7–13)
POIKILOCYTOSIS BLD QL AUTO: SLIGHT — SIGNIFICANT CHANGE UP
POLYCHROMASIA BLD QL SMEAR: SLIGHT — SIGNIFICANT CHANGE UP
POTASSIUM SERPL-MCNC: 4.5 MMOL/L — SIGNIFICANT CHANGE UP (ref 3.5–5.3)
POTASSIUM SERPL-SCNC: 4.5 MMOL/L — SIGNIFICANT CHANGE UP (ref 3.5–5.3)
RBC # BLD: 4.91 M/UL — SIGNIFICANT CHANGE UP (ref 4.2–5.8)
RBC # FLD: 14.6 % — HIGH (ref 10.3–14.5)
RH IG SCN BLD-IMP: POSITIVE — SIGNIFICANT CHANGE UP
SODIUM SERPL-SCNC: 142 MMOL/L — SIGNIFICANT CHANGE UP (ref 135–145)
WBC # BLD: 6.58 K/UL — SIGNIFICANT CHANGE UP (ref 3.8–10.5)
WBC # FLD AUTO: 6.58 K/UL — SIGNIFICANT CHANGE UP (ref 3.8–10.5)

## 2020-08-04 PROCEDURE — 93010 ELECTROCARDIOGRAM REPORT: CPT

## 2020-08-04 RX ORDER — SODIUM CHLORIDE 9 MG/ML
1000 INJECTION, SOLUTION INTRAVENOUS
Refills: 0 | Status: DISCONTINUED | OUTPATIENT
Start: 2020-08-11 | End: 2020-08-26

## 2020-08-04 RX ORDER — ATORVASTATIN CALCIUM 80 MG/1
1 TABLET, FILM COATED ORAL
Qty: 0 | Refills: 0 | DISCHARGE

## 2020-08-04 RX ORDER — METOPROLOL TARTRATE 50 MG
1 TABLET ORAL
Qty: 0 | Refills: 0 | DISCHARGE

## 2020-08-04 RX ORDER — POTASSIUM CHLORIDE 20 MEQ
0 PACKET (EA) ORAL
Qty: 0 | Refills: 0 | DISCHARGE

## 2020-08-04 RX ORDER — FUROSEMIDE 40 MG
1 TABLET ORAL
Qty: 0 | Refills: 0 | DISCHARGE

## 2020-08-04 RX ORDER — OMEPRAZOLE 10 MG/1
1 CAPSULE, DELAYED RELEASE ORAL
Qty: 0 | Refills: 0 | DISCHARGE

## 2020-08-04 NOTE — H&P PST ADULT - HISTORY OF PRESENT ILLNESS
86 year old male presents to presurgical testing with diagnosis of malignant melanoma to left upper limb scheduled for wide excision of melanoma of left shoulder and axillary cervical sentinel lymph node biopsy. Pt with non healing left shoulder lesion biopsy positive for melanoma.

## 2020-08-04 NOTE — H&P PST ADULT - HEM GEN HX ROS MEA POS PC
no masses palpable/bowel sounds normal/no rebound tenderness/no rigidity/no guarding/soft/no distention/nontender
bruises easily

## 2020-08-04 NOTE — H&P PST ADULT - NSANTHOSAYNRD_GEN_A_CORE
No. SOLANGE screening performed.  STOP BANG Legend: 0-2 = LOW Risk; 3-4 = INTERMEDIATE Risk; 5-8 = HIGH Risk

## 2020-08-04 NOTE — H&P PST ADULT - NSICDXPASTSURGICALHX_GEN_ALL_CORE_FT
PAST SURGICAL HISTORY:  H/O hernia repair     S/P cholecystectomy     S/P colon resection due to diverticulitis PAST SURGICAL HISTORY:  H/O hernia repair     History of pseudoaneurysm s/p repair    S/P cholecystectomy     S/P colon resection due to diverticulitis

## 2020-08-04 NOTE — H&P PST ADULT - RS GEN PE MLT RESP DETAILS PC
clear to auscultation bilaterally/airway patent/good air movement/no rales/breath sounds equal/no wheezes/no rhonchi/respirations non-labored

## 2020-08-04 NOTE — H&P PST ADULT - NEGATIVE CARDIOVASCULAR SYMPTOMS
no palpitations/no dyspnea on exertion/no chest pain/no peripheral edema/no paroxysmal nocturnal dyspnea

## 2020-08-04 NOTE — H&P PST ADULT - ASSESSMENT
malignant melanoma of left upper limb Problem: malignant melanoma of left upper limb   Assessment and Plan: Pt is tentatively scheduled for wide excision of melanoma of left shoulder and axillary cervical sentinel lymph node biopsy for 8/11/20. Pre-op instructions provided. Pt given verbal and written instructions with teach back on chlorhexidine shampoo and pepcid. Pt verbalized understanding with return demonstration.   COVID test scheduled for 8/8    Problem: hypothyroidism  Assessment and Plan: Pt instructed to take levothyroxine on the morning of procedure     Cardiac evaluation in chart with ekg.     Pt states he does not wish to receive general anesthesia for the procedure.     SOLANGE precautions, OR booking notified

## 2020-08-04 NOTE — H&P PST ADULT - MUSCULOSKELETAL
details… detailed exam ROM intact/no joint swelling/no joint warmth/no calf tenderness/diminished strength/no joint erythema

## 2020-08-04 NOTE — H&P PST ADULT - NSICDXPASTMEDICALHX_GEN_ALL_CORE_FT
PAST MEDICAL HISTORY:  Aortic stenosis     GERD (gastroesophageal reflux disease)     H/O angina pectoris     HTN (hypertension)     Hyperlipidemia     Hypothyroidism     Malignant melanoma left shoulder    Other primary thrombocytopenia

## 2020-08-04 NOTE — H&P PST ADULT - NEGATIVE ENMT SYMPTOMS
no hearing difficulty/no nose bleeds/no vertigo/no tinnitus/no sinus symptoms/no ear pain/no throat pain

## 2020-08-06 ENCOUNTER — OUTPATIENT (OUTPATIENT)
Dept: OUTPATIENT SERVICES | Facility: HOSPITAL | Age: 85
LOS: 1 days | End: 2020-08-06
Payer: MEDICARE

## 2020-08-06 DIAGNOSIS — Z98.890 OTHER SPECIFIED POSTPROCEDURAL STATES: Chronic | ICD-10-CM

## 2020-08-06 DIAGNOSIS — Z90.49 ACQUIRED ABSENCE OF OTHER SPECIFIED PARTS OF DIGESTIVE TRACT: Chronic | ICD-10-CM

## 2020-08-06 DIAGNOSIS — Z86.79 PERSONAL HISTORY OF OTHER DISEASES OF THE CIRCULATORY SYSTEM: Chronic | ICD-10-CM

## 2020-08-06 PROBLEM — D69.49 OTHER PRIMARY THROMBOCYTOPENIA: Chronic | Status: ACTIVE | Noted: 2020-08-04

## 2020-08-06 PROBLEM — C43.9 MALIGNANT MELANOMA OF SKIN, UNSPECIFIED: Chronic | Status: ACTIVE | Noted: 2020-08-04

## 2020-08-06 PROBLEM — E78.5 HYPERLIPIDEMIA, UNSPECIFIED: Chronic | Status: ACTIVE | Noted: 2020-08-04

## 2020-08-06 PROBLEM — K21.9 GASTRO-ESOPHAGEAL REFLUX DISEASE WITHOUT ESOPHAGITIS: Chronic | Status: ACTIVE | Noted: 2020-08-04

## 2020-08-06 PROBLEM — I35.0 NONRHEUMATIC AORTIC (VALVE) STENOSIS: Chronic | Status: ACTIVE | Noted: 2020-08-04

## 2020-08-06 PROBLEM — E03.9 HYPOTHYROIDISM, UNSPECIFIED: Chronic | Status: ACTIVE | Noted: 2020-08-04

## 2020-08-06 LAB
BLD GP AB SCN SERPL QL: POSITIVE — SIGNIFICANT CHANGE UP
DAT C3-SP REAG RBC QL: NEGATIVE — SIGNIFICANT CHANGE UP
DAT POLY-SP REAG RBC QL: NEGATIVE — SIGNIFICANT CHANGE UP
DIRECT COOMBS IGG: NEGATIVE — SIGNIFICANT CHANGE UP
RH IG SCN BLD-IMP: POSITIVE — SIGNIFICANT CHANGE UP

## 2020-08-06 PROCEDURE — 86077 PHYS BLOOD BANK SERV XMATCH: CPT

## 2020-08-07 ENCOUNTER — OUTPATIENT (OUTPATIENT)
Dept: OUTPATIENT SERVICES | Facility: HOSPITAL | Age: 85
LOS: 1 days | End: 2020-08-07

## 2020-08-07 DIAGNOSIS — Z90.49 ACQUIRED ABSENCE OF OTHER SPECIFIED PARTS OF DIGESTIVE TRACT: Chronic | ICD-10-CM

## 2020-08-07 DIAGNOSIS — Z98.890 OTHER SPECIFIED POSTPROCEDURAL STATES: Chronic | ICD-10-CM

## 2020-08-07 DIAGNOSIS — Z86.79 PERSONAL HISTORY OF OTHER DISEASES OF THE CIRCULATORY SYSTEM: Chronic | ICD-10-CM

## 2020-08-07 DIAGNOSIS — Z01.818 ENCOUNTER FOR OTHER PREPROCEDURAL EXAMINATION: ICD-10-CM

## 2020-08-07 LAB
ANTIBODY ID 1_1: SIGNIFICANT CHANGE UP
ANTIBODY ID 1_2: SIGNIFICANT CHANGE UP
BLD GP AB SCN SERPL QL: POSITIVE — SIGNIFICANT CHANGE UP
DAT C3-SP REAG RBC QL: POSITIVE — SIGNIFICANT CHANGE UP
DAT POLY-SP REAG RBC QL: POSITIVE — SIGNIFICANT CHANGE UP
RH IG SCN BLD-IMP: POSITIVE — SIGNIFICANT CHANGE UP

## 2020-08-08 ENCOUNTER — APPOINTMENT (OUTPATIENT)
Dept: DISASTER EMERGENCY | Facility: CLINIC | Age: 85
End: 2020-08-08

## 2020-08-08 ENCOUNTER — TRANSCRIPTION ENCOUNTER (OUTPATIENT)
Age: 85
End: 2020-08-08

## 2020-08-09 LAB — SARS-COV-2 N GENE NPH QL NAA+PROBE: NOT DETECTED

## 2020-08-10 ENCOUNTER — TRANSCRIPTION ENCOUNTER (OUTPATIENT)
Age: 85
End: 2020-08-10

## 2020-08-10 NOTE — ASU PATIENT PROFILE, ADULT - PSH
H/O hernia repair    History of pseudoaneurysm  s/p repair  S/P cholecystectomy    S/P colon resection  due to diverticulitis

## 2020-08-10 NOTE — ASU PATIENT PROFILE, ADULT - PMH
Aortic stenosis    GERD (gastroesophageal reflux disease)    H/O angina pectoris    HTN (hypertension)    Hyperlipidemia    Hypothyroidism    Malignant melanoma  left shoulder  Other primary thrombocytopenia

## 2020-08-11 ENCOUNTER — RESULT REVIEW (OUTPATIENT)
Age: 85
End: 2020-08-11

## 2020-08-11 ENCOUNTER — OUTPATIENT (OUTPATIENT)
Dept: OUTPATIENT SERVICES | Facility: HOSPITAL | Age: 85
LOS: 1 days | Discharge: HOME | End: 2020-08-11
Payer: MEDICARE

## 2020-08-11 ENCOUNTER — APPOINTMENT (OUTPATIENT)
Dept: NUCLEAR MEDICINE | Facility: HOSPITAL | Age: 85
End: 2020-08-11

## 2020-08-11 ENCOUNTER — APPOINTMENT (OUTPATIENT)
Dept: SURGICAL ONCOLOGY | Facility: HOSPITAL | Age: 85
End: 2020-08-11

## 2020-08-11 VITALS
TEMPERATURE: 98 F | HEIGHT: 66 IN | HEART RATE: 68 BPM | SYSTOLIC BLOOD PRESSURE: 173 MMHG | RESPIRATION RATE: 16 BRPM | OXYGEN SATURATION: 96 % | DIASTOLIC BLOOD PRESSURE: 72 MMHG | WEIGHT: 190.04 LBS

## 2020-08-11 VITALS — HEART RATE: 57 BPM | RESPIRATION RATE: 18 BRPM | OXYGEN SATURATION: 100 %

## 2020-08-11 DIAGNOSIS — C43.62 MALIGNANT MELANOMA OF LEFT UPPER LIMB, INCLUDING SHOULDER: ICD-10-CM

## 2020-08-11 DIAGNOSIS — Z90.49 ACQUIRED ABSENCE OF OTHER SPECIFIED PARTS OF DIGESTIVE TRACT: Chronic | ICD-10-CM

## 2020-08-11 DIAGNOSIS — Z86.79 PERSONAL HISTORY OF OTHER DISEASES OF THE CIRCULATORY SYSTEM: Chronic | ICD-10-CM

## 2020-08-11 DIAGNOSIS — Z98.890 OTHER SPECIFIED POSTPROCEDURAL STATES: Chronic | ICD-10-CM

## 2020-08-11 LAB
BLD GP AB SCN SERPL QL: POSITIVE — SIGNIFICANT CHANGE UP
DAT C3-SP REAG RBC QL: POSITIVE — SIGNIFICANT CHANGE UP
DAT POLY-SP REAG RBC QL: POSITIVE — SIGNIFICANT CHANGE UP
DIRECT COOMBS IGG: NEGATIVE — SIGNIFICANT CHANGE UP
RH IG SCN BLD-IMP: POSITIVE — SIGNIFICANT CHANGE UP

## 2020-08-11 PROCEDURE — 38525 BIOPSY/REMOVAL LYMPH NODES: CPT

## 2020-08-11 PROCEDURE — 14000 TIS TRNFR TRUNK 10 SQ CM/<: CPT

## 2020-08-11 PROCEDURE — 78195 LYMPH SYSTEM IMAGING: CPT | Mod: 26

## 2020-08-11 PROCEDURE — 88307 TISSUE EXAM BY PATHOLOGIST: CPT | Mod: 26

## 2020-08-11 PROCEDURE — 88342 IMHCHEM/IMCYTCHM 1ST ANTB: CPT | Mod: 26

## 2020-08-11 PROCEDURE — 88341 IMHCHEM/IMCYTCHM EA ADD ANTB: CPT | Mod: 26

## 2020-08-11 PROCEDURE — 88305 TISSUE EXAM BY PATHOLOGIST: CPT | Mod: 26

## 2020-08-11 RX ORDER — CHOLECALCIFEROL (VITAMIN D3) 125 MCG
1 CAPSULE ORAL
Qty: 0 | Refills: 0 | DISCHARGE

## 2020-08-11 RX ORDER — ATORVASTATIN CALCIUM 80 MG/1
1 TABLET, FILM COATED ORAL
Qty: 0 | Refills: 0 | DISCHARGE

## 2020-08-11 RX ORDER — ASPIRIN/CALCIUM CARB/MAGNESIUM 324 MG
1 TABLET ORAL
Qty: 0 | Refills: 0 | DISCHARGE

## 2020-08-11 RX ORDER — FOLIC ACID 0.8 MG
1 TABLET ORAL
Qty: 0 | Refills: 0 | DISCHARGE

## 2020-08-11 RX ORDER — PREGABALIN 225 MG/1
1 CAPSULE ORAL
Qty: 0 | Refills: 0 | DISCHARGE

## 2020-08-11 RX ORDER — METOPROLOL TARTRATE 50 MG
1 TABLET ORAL
Qty: 0 | Refills: 0 | DISCHARGE

## 2020-08-11 RX ORDER — SODIUM CHLORIDE 9 MG/ML
1000 INJECTION, SOLUTION INTRAVENOUS
Refills: 0 | Status: DISCONTINUED | OUTPATIENT
Start: 2020-08-11 | End: 2020-08-26

## 2020-08-11 RX ORDER — LEVOTHYROXINE SODIUM 125 MCG
1 TABLET ORAL
Qty: 0 | Refills: 0 | DISCHARGE

## 2020-08-11 RX ADMIN — SODIUM CHLORIDE 30 MILLILITER(S): 9 INJECTION, SOLUTION INTRAVENOUS at 09:52

## 2020-08-11 NOTE — ASU DISCHARGE PLAN (ADULT/PEDIATRIC) - CALL YOUR DOCTOR IF YOU HAVE ANY OF THE FOLLOWING:
Bleeding that does not stop/Swelling that gets worse/Numbness, tingling, color or temperature change to extremity/Nausea and vomiting that does not stop/Fever greater than (need to indicate Fahrenheit or Celsius)/Pain not relieved by Medications/Wound/Surgical Site with redness, or foul smelling discharge or pus Swelling that gets worse/Numbness, tingling, color or temperature change to extremity/Nausea and vomiting that does not stop/Bleeding that does not stop/Fever greater than (need to indicate Fahrenheit or Celsius)/Inability to tolerate liquids or foods/Unable to urinate/Pain not relieved by Medications/Wound/Surgical Site with redness, or foul smelling discharge or pus

## 2020-08-11 NOTE — ASU DISCHARGE PLAN (ADULT/PEDIATRIC) - CARE PROVIDER_API CALL
Mustapha Monique (MD)  Surgery  [address] [place] [address]  Phone: [phone]  Fax: [phone]  Follow Up Time:

## 2020-08-11 NOTE — BRIEF OPERATIVE NOTE - NSICDXBRIEFPREOP_GEN_ALL_CORE_FT
· Permanent afib  Seen by cardiology last admission prior to OR     · Rate controlled on tele, not on AC due to history of ICH in the past PRE-OP DIAGNOSIS:  Malignant melanoma 11-Aug-2020 15:00:39  Jey Palencia

## 2020-08-11 NOTE — ASU DISCHARGE PLAN (ADULT/PEDIATRIC) - ASU DC SPECIAL INSTRUCTIONSFT
Follow up:  Please call the office and schedule an appointment to see Dr. Monique within 2 weeks from discharge.    Pain control:  You may take Tylenol, 325mg or 650mg by mouth every 6 hours  as needed while pain persists. If you are still experiencing pain, you may additionally take Motrin 200mg by mouth every 4-6 hours, in between doses of Tylenol. If you are still experiencing pain on this course of over-the-counter pain meds, you may  your prescription of Percocet, which has already been sent to your home pharmacy.    Wound Care:  You may resume bathing as usual. Do not remove the Glue/bandage on your shoulder and armpit. These will come off on their own in a few days in the shower.

## 2020-08-11 NOTE — BRIEF OPERATIVE NOTE - NSICDXBRIEFPROCEDURE_GEN_ALL_CORE_FT
PROCEDURES:  Wide excision, melanoma, upper extremity, with sentinel lymph node biopsy 11-Aug-2020 14:59:12  Jey Palencia

## 2020-08-11 NOTE — ASU DISCHARGE PLAN (ADULT/PEDIATRIC) - FOLLOW UP APPOINTMENTS
911 or go to the nearest Emergency Room may also call Recovery Room (PACU) 24/7 @ [phone]/[facility] ASU (Adult):

## 2020-08-11 NOTE — BRIEF OPERATIVE NOTE - OPERATION/FINDINGS
Nodular melanoma of the left shoulder excised with surrounding skin (13cm x 5 cm, along the long axis of the upper extremity). 4 nodes excised from left axilla.

## 2020-08-19 DIAGNOSIS — Z01.818 ENCOUNTER FOR OTHER PREPROCEDURAL EXAMINATION: ICD-10-CM

## 2020-08-19 DIAGNOSIS — C43.62 MALIGNANT MELANOMA OF LEFT UPPER LIMB, INCLUDING SHOULDER: ICD-10-CM

## 2020-08-19 LAB — SURGICAL PATHOLOGY STUDY: SIGNIFICANT CHANGE UP

## 2020-08-27 ENCOUNTER — APPOINTMENT (OUTPATIENT)
Dept: SURGICAL ONCOLOGY | Facility: CLINIC | Age: 85
End: 2020-08-27
Payer: MEDICARE

## 2020-08-27 VITALS
DIASTOLIC BLOOD PRESSURE: 82 MMHG | HEIGHT: 67 IN | HEART RATE: 69 BPM | SYSTOLIC BLOOD PRESSURE: 149 MMHG | BODY MASS INDEX: 29.51 KG/M2 | WEIGHT: 188 LBS | RESPIRATION RATE: 16 BRPM | OXYGEN SATURATION: 94 %

## 2020-08-27 PROCEDURE — 99024 POSTOP FOLLOW-UP VISIT: CPT

## 2020-08-31 NOTE — REASON FOR VISIT
[Post-Op] : a post-op for [Melanoma] : melanoma [Initial Consultation] : an initial consultation for [Referred By: ___] : Referred By: [unfilled] [Family Member] : family member [Skin Cancer] : skin caner [FreeTextEntry2] : malignant melanoma

## 2020-08-31 NOTE — ASSESSMENT
[FreeTextEntry1] : Mr. Boothe now has pathologically confirmed T4bN1a stage IIIC melanoma of the left shoulder. Healing appropriately. We discussed the sigficance of stage III disease and discussed the according to date from the MSLT-II trial completion dorothea dissection is not warranted at this time, however systemic immonotherapy/targeted therapy likely is. We discussed the need for full systemic metastatic workup at this time with cross-sectional imaging, and the need for frequent axillary surveillance in an attempt to identify further axillary metastasis so that salvage lymphadenectomy can still be possible in the future if needed. \par \par Plan:\par - I will refer Mr. Boothe to Dr. Russ Munoz for consultation on immuno/targeted therapy.\par - Axillary Surveillance US Scan every 3-4 months. \par -staging CT chest/abdomen/pelvis now\par - Routine Followup - 6 months. \par All questions/concerns addressed.

## 2020-08-31 NOTE — ADDENDUM
[FreeTextEntry1] : I, Donell Machado, acted soley as a scribe for Dr. Kayden MD on this date 08/27/2020 \par

## 2020-08-31 NOTE — PHYSICAL EXAM
[Normal] : supple, no neck mass and thyroid not enlarged [Normal Neck Lymph Nodes] : normal neck lymph nodes  [Normal Supraclavicular Lymph Nodes] : normal supraclavicular lymph nodes [Normal Groin Lymph Nodes] : normal groin lymph nodes [Normal Axillary Lymph Nodes] : normal axillary lymph nodes [Normal] : grossly intact [FreeTextEntry1] : COVID -19 precautions as per [facility] policy was universally followed.  [de-identified] : left axillary incision C/D/I, no seroma [de-identified] : left shoulder incision clean, dry, intact

## 2020-08-31 NOTE — HISTORY OF PRESENT ILLNESS
[de-identified] : Larry Boothe is an 86 year old male who presents today for a post op consultation for left shoulder melanoma. He reports a lesion on his left shoulder for a "couple of months" that was not healing.  Reports he was self treating with bacitracin. He went to his dermatologist in [place] who performed a biopsy of the lesion. Dermatopathology report from 6/18/2020 revealed malignant melanoma on the left medial/lateral shoulder. Has been using AquaForm as per his dermatologist. He recently had a [place] Lymphoscintigraphy Melanoma Procedure 08/11/20: IMP: Lymphoscintigraphy demonstrates left axillary dorothea accumulation of radiopharmaceutical. Today 08/27/20 pt is w/o any complaints. Denies any new skin lesions, changes, pruritic moles. States he takes a baby aspirin 81mg daily. Pt was w/o any complaints. Denies any pain and denies any irregular eating habits.  Denies constitutional symptoms. Incision is healing well. No evidence of hematoma, seroma, or infection. \par \par Dermatopathology Report 6/18/20: \par Left Shoulder Medial: Malignant melanoma measuring at least 0.5 mm in thickness. Level III T1a at least\par Left Shoulder Lateral: Malignant melanoma measuring at least 0.45mm in thickness. Level II T1a at least\par \par INTERIM 8/27/20: Mr. Boothe returns for 2 week post-op visit following wide excision of a melanoma of the left shoulder with axillary sentinel lymph node biopsy. He denies any pain or wound-healing issues. He denies paresthesias of the left arm. Final pathology yielded a 5.2mm thick T4bN1a (1/4 sentinel nodes with isolated tumor cells) stage IIIC margin-negative malignant melanoma.

## 2020-09-12 ENCOUNTER — OUTPATIENT (OUTPATIENT)
Dept: OUTPATIENT SERVICES | Facility: HOSPITAL | Age: 85
LOS: 1 days | Discharge: HOME | End: 2020-09-12

## 2020-09-12 DIAGNOSIS — Z90.49 ACQUIRED ABSENCE OF OTHER SPECIFIED PARTS OF DIGESTIVE TRACT: Chronic | ICD-10-CM

## 2020-09-12 DIAGNOSIS — C61 MALIGNANT NEOPLASM OF PROSTATE: ICD-10-CM

## 2020-09-12 DIAGNOSIS — Z86.79 PERSONAL HISTORY OF OTHER DISEASES OF THE CIRCULATORY SYSTEM: Chronic | ICD-10-CM

## 2020-09-12 DIAGNOSIS — Z98.890 OTHER SPECIFIED POSTPROCEDURAL STATES: Chronic | ICD-10-CM

## 2020-09-15 ENCOUNTER — APPOINTMENT (OUTPATIENT)
Dept: HEMATOLOGY ONCOLOGY | Facility: CLINIC | Age: 85
End: 2020-09-15
Payer: MEDICARE

## 2020-09-15 VITALS
BODY MASS INDEX: 34.52 KG/M2 | HEIGHT: 61.42 IN | OXYGEN SATURATION: 94 % | HEART RATE: 86 BPM | SYSTOLIC BLOOD PRESSURE: 125 MMHG | WEIGHT: 185.19 LBS | DIASTOLIC BLOOD PRESSURE: 72 MMHG | TEMPERATURE: 99.1 F | RESPIRATION RATE: 16 BRPM

## 2020-09-15 DIAGNOSIS — Z60.2 PROBLEMS RELATED TO LIVING ALONE: ICD-10-CM

## 2020-09-15 DIAGNOSIS — I62.9 NONTRAUMATIC INTRACRANIAL HEMORRHAGE, UNSPECIFIED: ICD-10-CM

## 2020-09-15 DIAGNOSIS — M40.209 UNSPECIFIED KYPHOSIS, SITE UNSPECIFIED: ICD-10-CM

## 2020-09-15 DIAGNOSIS — Z80.41 FAMILY HISTORY OF MALIGNANT NEOPLASM OF OVARY: ICD-10-CM

## 2020-09-15 DIAGNOSIS — C43.62 MALIGNANT MELANOMA OF LEFT UPPER LIMB, INCLUDING SHOULDER: ICD-10-CM

## 2020-09-15 DIAGNOSIS — Z63.4 DISAPPEARANCE AND DEATH OF FAMILY MEMBER: ICD-10-CM

## 2020-09-15 DIAGNOSIS — Z80.3 FAMILY HISTORY OF MALIGNANT NEOPLASM OF BREAST: ICD-10-CM

## 2020-09-15 DIAGNOSIS — Z78.9 OTHER SPECIFIED HEALTH STATUS: ICD-10-CM

## 2020-09-15 DIAGNOSIS — Z80.0 FAMILY HISTORY OF MALIGNANT NEOPLASM OF DIGESTIVE ORGANS: ICD-10-CM

## 2020-09-15 PROCEDURE — 99205 OFFICE O/P NEW HI 60 MIN: CPT

## 2020-09-15 RX ORDER — POTASSIUM CHLORIDE 10 MEQ
10 CAPSULE, EXTENDED RELEASE ORAL
Refills: 0 | Status: DISCONTINUED | COMMUNITY
End: 2020-09-15

## 2020-09-15 RX ORDER — ENALAPRIL MALEATE 10 MG/1
10 TABLET ORAL
Refills: 0 | Status: DISCONTINUED | COMMUNITY
End: 2020-09-15

## 2020-09-15 RX ORDER — FUROSEMIDE 40 MG/1
40 TABLET ORAL
Refills: 0 | Status: DISCONTINUED | COMMUNITY
End: 2020-09-15

## 2020-09-15 RX ORDER — DENOSUMAB 60 MG/ML
60 INJECTION SUBCUTANEOUS
Qty: 1 | Refills: 0 | Status: DISCONTINUED | COMMUNITY
Start: 2018-05-22 | End: 2020-09-15

## 2020-09-15 SDOH — SOCIAL STABILITY - SOCIAL INSECURITY: DISSAPEARANCE AND DEATH OF FAMILY MEMBER: Z63.4

## 2020-09-15 SDOH — SOCIAL STABILITY - SOCIAL INSECURITY: PROBLEMS RELATED TO LIVING ALONE: Z60.2

## 2020-09-15 NOTE — HISTORY OF PRESENT ILLNESS
[Disease: _____________________] : Disease: [unfilled] [N: ___] : N[unfilled] [M: ___] : M[unfilled] [T: ___] : T[unfilled] [AJCC Stage: ____] : AJCC Stage: [unfilled] [de-identified] : Mr. Boothe is a 87 year old gentlemen with past medical history of HTN, HLD presenting to the office for an initial consultation of melanoma of the left shoulder.\par \par Patient initially reports a lesions on his left shoulder for a "couple of months" that was not healing. \par Reports he was self treating with Bacitracin.\par He went to his dermatologist who performed biopsy of the lesion on 6/18/2020\par Pathology demonstrated: malignant melanoma of the left medial/lateral shoulder.\par \par Dermatopathology Report 6/18/20: \par Left Shoulder Medial: Malignant melanoma measuring at least 0.5 mm in thickness. Level III T1a at least\par Left Shoulder Lateral: Malignant melanoma measuring at least 0.45 mm in thickness. Level II T1a at least. \par \par Patient was evaluated by Dr. Mustapha Monique on 6/29/2020 who recommended wide excision of melanoma with axillary sentinel node biopsy on 8/11/2020.\par \par Final pathology yielded a 5.2 mm thick T4bN1a (1/4 sentinel nodes with isolated tumor cells) stage IIIC margin-negative malignant melanoma.\par Mitotic rate 6 mm2 ; no LVI or regression noted.\par  [de-identified] : Surgical Oncology: Mustapha Monique\par PCP: Yan Mason \par Dermatology: Hayden Grigsby [phone]\par Cardiology: Michael Pruitt \par \par Daughter: Douglas\par Daughter: Aleah\par Son: Zac

## 2020-09-15 NOTE — CONSULT LETTER
[Dear  ___] : Dear  [unfilled], [Consult Letter:] : I had the pleasure of evaluating your patient, [unfilled]. [Please see my note below.] : Please see my note below. [Consult Closing:] : Thank you very much for allowing me to participate in the care of this patient.  If you have any questions, please do not hesitate to contact me. [Sincerely,] : Sincerely, [DrRandal  ___] : Dr. PAZ [DrRandal ___] : Dr. PAZ

## 2020-09-15 NOTE — PHYSICAL EXAM
[Ambulatory and capable of all self care but unable to carry out any work activities] : Status 2- Ambulatory and capable of all self care but unable to carry out any work activities. Up and about more than 50% of waking hours [Obese] : obese [Normal] : affect appropriate [de-identified] : k [de-identified] : severe kyphosis [de-identified] : s [de-identified] : surgical site on the left shoulder and axilla is healing well.

## 2020-10-13 ENCOUNTER — APPOINTMENT (OUTPATIENT)
Dept: CARDIOLOGY | Facility: CLINIC | Age: 85
End: 2020-10-13

## 2020-10-13 DIAGNOSIS — R06.00 DYSPNEA, UNSPECIFIED: ICD-10-CM

## 2020-10-13 DIAGNOSIS — Z87.891 PERSONAL HISTORY OF NICOTINE DEPENDENCE: ICD-10-CM

## 2020-10-13 DIAGNOSIS — E78.00 PURE HYPERCHOLESTEROLEMIA, UNSPECIFIED: ICD-10-CM

## 2021-04-06 NOTE — PATIENT PROFILE ADULT - PRIMARY SOURCE OF SUPPORT/COMFORT
PHYSICAL MEDICINE & REHABILITATION  SKILLED NURSING NOTE    Reason for visit: Evaluation to provide specialty care in Physical Medicine and Rehabilitation.     Referring physician: Dr. Mitch Urban  Supervising physician: Dr. Senia Kim    Facility: [facility]    History of Present Illness   Patient is 37 year old female who was admitted to skilled nursing secondary to admitted to [facility] on February 4, 2021.  Patient tells me that she came into the hospital with approximately 4 weeks of numbness, painful pins-and-needles sensation to the lower extremities as well as the hands.  She describes difficulty with ambulation.  She describes multiple falls.  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   Patient was started on folate supplementation as well as high-dose IV thiamine as well as gabapentin.  MRI of the spine survey did not show myelitis. EMG and nerve conduction velocities were recommended  Liver ultrasound showed moderate to severe hepatic steatosis. No spinal cord lesions however lesion in the central dandy.  Patient being seen by GI services with acute alcoholic hepatitis.  Evaluated by psychiatry services with generalized anxiety disorder with panic attacks, started on BuSpar.EMG and nerve conduction velocities were recommended She has been noted to need significant assistance in therapies.      The rehabilitation service was asked to evaluate the patient for rehabilitation needs.     She was followed by GI service given alcoholic hepatitis.  Overall patient's liver chemistries have markedly improved.  Abdominal pain resolved.  She is tolerating diet.  Patient was educated on importance of alcohol cessation.  No further inpatient GI recommendation.  Patient to follow-up with GI after discharge.  GI service signed off.  Patient was  evaluated by Dr. Lela Daniels     Psychiatry service following this patient.  Psychiatry service plans to slowly optimize dosing of buspirone per psychiatry service also discussed with patient to attend daily AA meetings after discharge with complete cessation from alcohol moving forward.  Patient was provided with brief supportive psychotherapy focused on psychoeducation.  Is currently on BuSpar 10 mg p.o. twice a day.  Gabapentin 600 mg 3 times daily.     Also noted to have microcytic anemia with reduced retic count.  Ferritin noted to be 335, TIBC 168.  Hemoglobin stable at 10.3 on 2/8/2021.     Most recently seen by neurologist  Note from 2/8/2021 reviwed MRI of the brain consistent with chronic central pontine myelinosis.  Neurologist recommended high-dose thiamine 250 IV daily x3 days then 100 mg p.o. daily.  Also recommended folate supplementation, gabapentin 600 mg 3 times daily.  Neurologist deferring EMG/NCS due to improvement so far with thiamine.     Patient tested negative for COVID-19 on 2/4/2021.     Patient is medically optimized and discharged to [facility] for subacute rehabilitation, insurance denied AIR.      2/22 the patient was seen and examined this morning.  She is pleasant.  Improving functionality.  No cough or shortness of breath.  No issues with her bowel or bladder.  Her appetite is good.  Eager for discharge.    3/2/2021: Patient seen and examined.  Discussed with .  Patient continues to have neuropathy in her bilateral fingers and feet.  She feels like she is doing well functionally.  Denies any bowel or bladder issues.    4/6 Patient seen.     Previous (prior) functional status:  Prior Living Situation  Information Provided By:: patient  Type of Home: House([facility])  Home Layout: Multi-level  # Steps to Enter: 5  # Steps in the Home: 24(12 to 2nd floor bedroom & bathroom, 12 to bsmt)  Number of Rails: 2  Lives With: Spouse, Son(18 y/o son & spouse)  Receives Help  From: Family  Transportation: Drives  Additional Comments: no AD  Worked until December as [employer]      Premorbid Functional Status:   Prior Communication and Cognition:  Prior Communication/Cognition  Prior Cognition: Intact  Prior Memory: Intact     Prior Level of Function:  Prior Function  Prior ADL: Independent  Eating: Independent  Bed Mobility: Independent  Transfers: Independent  Ambulation in the Home: Independent  Ambulation in the Community: Independent  Steps into the Home: Modified Independent  Steps within the Home: Modified Independent  Transfer Equipment: None  Locomotion Equipment: None  Car Transfers: Independent  History of Falls in past year: Yes  Number of falls in past year: pt no falls last year 2020, but 5 falls in 2021 since last month w/onset of numbness on BLE w/muscle spasms  Any fall with injury?: No  Prior Homemaking/IADLs: Independent  Hearing: No hearing deficits  Vocational: Other (comment)(Pt works as [employer] .)  Leisure: Hobbies - No    Current functional status:   3/2/2021:  Bed mobility supervision  Transfers SBA/CGA  Ambulating 300 feet with rolling walker CGA  Ambulating 200 feet without assistive device at CGA  Able to maneuver 18 stairs x2 with right rail descending at CGA, patient is impulsive  Upper body and lower body dressing SBA  Toileting and toilet transfers SBA  Eating and grooming independent  Bathing SBA/CGA      Review of Systems  Review of Systems   Constitutional: Positive for activity change. Negative for chills, fatigue and fever.   HENT: Negative for congestion, rhinorrhea and sore throat.    Respiratory: Negative for cough, shortness of breath and wheezing.    Cardiovascular: Negative for chest pain and palpitations.   Gastrointestinal: Negative for abdominal distention, abdominal pain and constipation.   Genitourinary: Negative for difficulty urinating.   Musculoskeletal: Positive for gait problem. Negative for joint swelling and myalgias.    Neurological: Positive for weakness and numbness. Negative for dizziness and headaches.   All other systems reviewed and are negative.        Past medical history  Past Medical History:   Diagnosis Date   • Acute alcoholic hepatitis 2/7/2021   • Anxiety 2/7/2021   • Essential (primary) hypertension      Family History  Family History   Problem Relation Age of Onset   • Heart disease Father    • Cancer Maternal Grandmother      Social History   Social History     Tobacco Use   • Smoking status: Current Every Day Smoker     Packs/day: 0.25     Years: 15.00     Pack years: 3.75     Types: Cigarettes   • Smokeless tobacco: Never Used   Substance Use Topics   • Alcohol use: Yes     Alcohol/week: 8.0 standard drinks     Types: 8 Glasses of wine per week   • Drug use: Never      Lives in a house with spouse and son  She was independent PTA however to hospitalization patient was furniture walking due to difficulty walking    Vitals:  Reviewed,  Stable  and  Afebrile.     PHYSICAL EXAM  Physical Exam  Vitals reviewed.   HENT:      Head: Normocephalic and atraumatic.      Nose: Nose normal.      Mouth/Throat:      Mouth: Mucous membranes are moist.   Eyes:      General: No scleral icterus.     Conjunctiva/sclera: Conjunctivae normal.   Cardiovascular:      Rate and Rhythm: Normal rate.      Heart sounds: Normal heart sounds.   Pulmonary:      Effort: Pulmonary effort is normal. No respiratory distress.   Abdominal:      General: There is no distension.      Palpations: Abdomen is soft.      Tenderness: There is no abdominal tenderness.   Musculoskeletal:      Cervical back: Neck supple.      Comments: Good functional strength bilateral upper extremities  Bilateral hips 5/5  Bilateral DF/PF 4/5  Bilateral hand grasp 4/5  Bilateral finger adduction 4+/5  Bilateral wrist flexion 4+/5   Skin:     General: Skin is warm and dry.   Neurological:      Mental Status: She is alert and oriented to person, place, and time.       Sensory: Sensory deficit present.      Gait: Gait abnormal.      Comments: Good functional strength all 4 extremities.  Decreased strength and sensation noted to the bilateral wrists and fingers.  Also decreased strength and sensation noted distally from the knees down bilaterally.    Psychiatric:         Mood and Affect: Mood normal.         Behavior: Behavior normal.         Results    Assessment and plan  Problem List Items Addressed This Visit        Circulatory    Essential hypertension, benign       Digestive    Dry beriberi    Acute alcoholic hepatitis       Other    Gait difficulty    DVT prophylaxis    Physical debility - Primary          The patient was seen and examined this morning.  She is eager to discharge.  She has continued to make functional progress with therapies.  Patient has improving strength in her fingers, wrists, bilateral DF and PF.  Therapy notes were reviewed.  Discussed with the therapy staff.  Medical chart was reviewed.  Discussed with .     to check on insurance coverage for home health therapies and outpatient therapies.  If patient has insurance coverage for both home health and outpatient therapies and if patient has 24-hour supervision/assistance available at home, patient may be appropriate to be discharged home with home health PT and OT and then eventual transition to outpatient therapies with PT and OT.  If patient does not have adequate insurance coverage for home health and/or outpatient therapies, then would recommend continuing subacute rehabilitation.    Upon discharge from subacute rehabilitation, would recommend that patient follow-up with Dr. Senia Kim and 3 to 4 weeks for any rehab needs.    Signature:  Maru Crews, CNP   child(zee)

## 2021-05-18 ENCOUNTER — EMERGENCY (EMERGENCY)
Facility: HOSPITAL | Age: 86
LOS: 1 days | Discharge: HOME | End: 2021-05-18
Attending: EMERGENCY MEDICINE
Payer: MEDICARE

## 2021-05-18 VITALS
RESPIRATION RATE: 18 BRPM | TEMPERATURE: 98 F | HEART RATE: 74 BPM | SYSTOLIC BLOOD PRESSURE: 145 MMHG | OXYGEN SATURATION: 100 % | DIASTOLIC BLOOD PRESSURE: 65 MMHG | HEIGHT: 66 IN

## 2021-05-18 DIAGNOSIS — Z98.890 OTHER SPECIFIED POSTPROCEDURAL STATES: Chronic | ICD-10-CM

## 2021-05-18 DIAGNOSIS — Z86.79 PERSONAL HISTORY OF OTHER DISEASES OF THE CIRCULATORY SYSTEM: Chronic | ICD-10-CM

## 2021-05-18 DIAGNOSIS — Z90.49 ACQUIRED ABSENCE OF OTHER SPECIFIED PARTS OF DIGESTIVE TRACT: Chronic | ICD-10-CM

## 2021-05-18 LAB
ALBUMIN SERPL ELPH-MCNC: 2.8 G/DL — LOW (ref 3.3–5.2)
ALP SERPL-CCNC: 52 U/L — SIGNIFICANT CHANGE UP (ref 40–120)
ALT FLD-CCNC: 14 U/L — SIGNIFICANT CHANGE UP
ANION GAP SERPL CALC-SCNC: 6 MMOL/L — SIGNIFICANT CHANGE UP (ref 5–17)
APPEARANCE UR: CLEAR — SIGNIFICANT CHANGE UP
AST SERPL-CCNC: 14 U/L — SIGNIFICANT CHANGE UP
BACTERIA # UR AUTO: NEGATIVE — SIGNIFICANT CHANGE UP
BILIRUB SERPL-MCNC: 0.5 MG/DL — SIGNIFICANT CHANGE UP (ref 0.4–2)
BILIRUB UR-MCNC: NEGATIVE — SIGNIFICANT CHANGE UP
BUN SERPL-MCNC: 37 MG/DL — HIGH (ref 8–20)
CALCIUM SERPL-MCNC: 8.4 MG/DL — LOW (ref 8.6–10.2)
CHLORIDE SERPL-SCNC: 104 MMOL/L — SIGNIFICANT CHANGE UP (ref 98–107)
CO2 SERPL-SCNC: 28 MMOL/L — SIGNIFICANT CHANGE UP (ref 22–29)
COLOR SPEC: YELLOW — SIGNIFICANT CHANGE UP
CREAT SERPL-MCNC: 0.98 MG/DL — SIGNIFICANT CHANGE UP (ref 0.5–1.3)
DIFF PNL FLD: NEGATIVE — SIGNIFICANT CHANGE UP
EPI CELLS # UR: NEGATIVE — SIGNIFICANT CHANGE UP
GLUCOSE SERPL-MCNC: 102 MG/DL — HIGH (ref 70–99)
GLUCOSE UR QL: NEGATIVE MG/DL — SIGNIFICANT CHANGE UP
HCT VFR BLD CALC: 43 % — SIGNIFICANT CHANGE UP (ref 39–50)
HGB BLD-MCNC: 14.2 G/DL — SIGNIFICANT CHANGE UP (ref 13–17)
KETONES UR-MCNC: NEGATIVE — SIGNIFICANT CHANGE UP
LEUKOCYTE ESTERASE UR-ACNC: NEGATIVE — SIGNIFICANT CHANGE UP
MCHC RBC-ENTMCNC: 31 PG — SIGNIFICANT CHANGE UP (ref 27–34)
MCHC RBC-ENTMCNC: 33 GM/DL — SIGNIFICANT CHANGE UP (ref 32–36)
MCV RBC AUTO: 93.9 FL — SIGNIFICANT CHANGE UP (ref 80–100)
NITRITE UR-MCNC: NEGATIVE — SIGNIFICANT CHANGE UP
NT-PROBNP SERPL-SCNC: 1464 PG/ML — HIGH (ref 0–300)
PH UR: 5 — SIGNIFICANT CHANGE UP (ref 5–8)
PLATELET # BLD AUTO: 133 K/UL — LOW (ref 150–400)
POTASSIUM SERPL-MCNC: 5 MMOL/L — SIGNIFICANT CHANGE UP (ref 3.5–5.3)
POTASSIUM SERPL-SCNC: 5 MMOL/L — SIGNIFICANT CHANGE UP (ref 3.5–5.3)
PROT SERPL-MCNC: 4.9 G/DL — LOW (ref 6.6–8.7)
PROT UR-MCNC: 100 MG/DL
RBC # BLD: 4.58 M/UL — SIGNIFICANT CHANGE UP (ref 4.2–5.8)
RBC # FLD: 14.9 % — HIGH (ref 10.3–14.5)
RBC CASTS # UR COMP ASSIST: NEGATIVE /HPF — SIGNIFICANT CHANGE UP (ref 0–4)
SARS-COV-2 RNA SPEC QL NAA+PROBE: SIGNIFICANT CHANGE UP
SODIUM SERPL-SCNC: 138 MMOL/L — SIGNIFICANT CHANGE UP (ref 135–145)
SP GR SPEC: 1.01 — SIGNIFICANT CHANGE UP (ref 1.01–1.02)
TROPONIN T SERPL-MCNC: 0.01 NG/ML — SIGNIFICANT CHANGE UP (ref 0–0.06)
UROBILINOGEN FLD QL: NEGATIVE MG/DL — SIGNIFICANT CHANGE UP
WBC # BLD: 10.14 K/UL — SIGNIFICANT CHANGE UP (ref 3.8–10.5)
WBC # FLD AUTO: 10.14 K/UL — SIGNIFICANT CHANGE UP (ref 3.8–10.5)
WBC UR QL: NEGATIVE — SIGNIFICANT CHANGE UP

## 2021-05-18 PROCEDURE — 93010 ELECTROCARDIOGRAM REPORT: CPT

## 2021-05-18 PROCEDURE — 73610 X-RAY EXAM OF ANKLE: CPT | Mod: 26,LT

## 2021-05-18 PROCEDURE — 72170 X-RAY EXAM OF PELVIS: CPT | Mod: 26

## 2021-05-18 PROCEDURE — 99220: CPT

## 2021-05-18 PROCEDURE — 72148 MRI LUMBAR SPINE W/O DYE: CPT | Mod: 26,ME

## 2021-05-18 PROCEDURE — 70450 CT HEAD/BRAIN W/O DYE: CPT | Mod: 26,ME

## 2021-05-18 PROCEDURE — 71045 X-RAY EXAM CHEST 1 VIEW: CPT | Mod: 26

## 2021-05-18 PROCEDURE — G1004: CPT

## 2021-05-18 PROCEDURE — 99283 EMERGENCY DEPT VISIT LOW MDM: CPT

## 2021-05-18 PROCEDURE — 93970 EXTREMITY STUDY: CPT | Mod: 26

## 2021-05-18 PROCEDURE — 72131 CT LUMBAR SPINE W/O DYE: CPT | Mod: 26,ME

## 2021-05-18 RX ORDER — OXYCODONE AND ACETAMINOPHEN 5; 325 MG/1; MG/1
1 TABLET ORAL ONCE
Refills: 0 | Status: DISCONTINUED | OUTPATIENT
Start: 2021-05-18 | End: 2021-05-18

## 2021-05-18 RX ORDER — METHOCARBAMOL 500 MG/1
1000 TABLET, FILM COATED ORAL ONCE
Refills: 0 | Status: COMPLETED | OUTPATIENT
Start: 2021-05-18 | End: 2021-05-18

## 2021-05-18 RX ORDER — RIVAROXABAN 15 MG-20MG
15 KIT ORAL DAILY
Refills: 0 | Status: DISCONTINUED | OUTPATIENT
Start: 2021-05-18 | End: 2021-05-22

## 2021-05-18 RX ORDER — LIDOCAINE 4 G/100G
1 CREAM TOPICAL ONCE
Refills: 0 | Status: COMPLETED | OUTPATIENT
Start: 2021-05-18 | End: 2021-05-18

## 2021-05-18 RX ORDER — FUROSEMIDE 40 MG
40 TABLET ORAL ONCE
Refills: 0 | Status: COMPLETED | OUTPATIENT
Start: 2021-05-18 | End: 2021-05-18

## 2021-05-18 RX ORDER — MORPHINE SULFATE 50 MG/1
4 CAPSULE, EXTENDED RELEASE ORAL ONCE
Refills: 0 | Status: DISCONTINUED | OUTPATIENT
Start: 2021-05-18 | End: 2021-05-18

## 2021-05-18 RX ORDER — SODIUM CHLORIDE 9 MG/ML
500 INJECTION INTRAMUSCULAR; INTRAVENOUS; SUBCUTANEOUS ONCE
Refills: 0 | Status: COMPLETED | OUTPATIENT
Start: 2021-05-18 | End: 2021-05-18

## 2021-05-18 RX ORDER — LEVOTHYROXINE SODIUM 125 MCG
75 TABLET ORAL DAILY
Refills: 0 | Status: DISCONTINUED | OUTPATIENT
Start: 2021-05-18 | End: 2021-05-22

## 2021-05-18 RX ORDER — FUROSEMIDE 40 MG
40 TABLET ORAL DAILY
Refills: 0 | Status: DISCONTINUED | OUTPATIENT
Start: 2021-05-18 | End: 2021-05-22

## 2021-05-18 RX ORDER — MORPHINE SULFATE 50 MG/1
4 CAPSULE, EXTENDED RELEASE ORAL EVERY 4 HOURS
Refills: 0 | Status: DISCONTINUED | OUTPATIENT
Start: 2021-05-18 | End: 2021-05-19

## 2021-05-18 RX ORDER — ATORVASTATIN CALCIUM 80 MG/1
10 TABLET, FILM COATED ORAL AT BEDTIME
Refills: 0 | Status: DISCONTINUED | OUTPATIENT
Start: 2021-05-18 | End: 2021-05-22

## 2021-05-18 RX ORDER — METOPROLOL TARTRATE 50 MG
25 TABLET ORAL DAILY
Refills: 0 | Status: DISCONTINUED | OUTPATIENT
Start: 2021-05-18 | End: 2021-05-22

## 2021-05-18 RX ADMIN — MORPHINE SULFATE 4 MILLIGRAM(S): 50 CAPSULE, EXTENDED RELEASE ORAL at 14:35

## 2021-05-18 RX ADMIN — Medication 75 MICROGRAM(S): at 14:45

## 2021-05-18 RX ADMIN — OXYCODONE AND ACETAMINOPHEN 1 TABLET(S): 5; 325 TABLET ORAL at 09:58

## 2021-05-18 RX ADMIN — OXYCODONE AND ACETAMINOPHEN 1 TABLET(S): 5; 325 TABLET ORAL at 07:45

## 2021-05-18 RX ADMIN — SODIUM CHLORIDE 500 MILLILITER(S): 9 INJECTION INTRAMUSCULAR; INTRAVENOUS; SUBCUTANEOUS at 16:30

## 2021-05-18 RX ADMIN — MORPHINE SULFATE 4 MILLIGRAM(S): 50 CAPSULE, EXTENDED RELEASE ORAL at 09:58

## 2021-05-18 RX ADMIN — SODIUM CHLORIDE 500 MILLILITER(S): 9 INJECTION INTRAMUSCULAR; INTRAVENOUS; SUBCUTANEOUS at 14:35

## 2021-05-18 RX ADMIN — MORPHINE SULFATE 4 MILLIGRAM(S): 50 CAPSULE, EXTENDED RELEASE ORAL at 11:21

## 2021-05-18 RX ADMIN — METHOCARBAMOL 1000 MILLIGRAM(S): 500 TABLET, FILM COATED ORAL at 07:45

## 2021-05-18 RX ADMIN — ATORVASTATIN CALCIUM 10 MILLIGRAM(S): 80 TABLET, FILM COATED ORAL at 21:06

## 2021-05-18 RX ADMIN — LIDOCAINE 1 PATCH: 4 CREAM TOPICAL at 14:35

## 2021-05-18 RX ADMIN — MORPHINE SULFATE 4 MILLIGRAM(S): 50 CAPSULE, EXTENDED RELEASE ORAL at 14:50

## 2021-05-18 RX ADMIN — Medication 40 MILLIGRAM(S): at 09:58

## 2021-05-18 NOTE — ED ADULT NURSE REASSESSMENT NOTE - NS ED NURSE REASSESS COMMENT FT1
Assumed pt care at this time. Pt resting comfortably in stretcher, A&Ox3, NAD noted, respirations even and nonlabored. Pt offers no complaints at this time. Awaiting [facility] placement.

## 2021-05-18 NOTE — ED CDU PROVIDER INITIAL DAY NOTE - ATTENDING CONTRIBUTION TO CARE
I, Rico Pabon, performed a face to face bedside interview with this patient regarding history of present illness, review of symptoms and relevant past medical, social and family history.  I completed an independent physical examination. I have communicated the patient’s plan of care and disposition with the ACP.  87 year old male presents with back pain x 8 days. Pt had fall in bathroom. no numbness, tingling, weakness, bowel/bladder dysfunction. Found to have L3 fracture, placed on Obs for MRI, PT, spine consult  Gen: NAD, well appearing  CV: RRR  Pul: CTA b/l  Abd: Soft, non-distended, non-tender  Neuro: no focal deficits  msk: +midline lumbar spinal tenderness, ttp and mild edema to the L ankle

## 2021-05-18 NOTE — CONSULT NOTE ADULT - SUBJECTIVE AND OBJECTIVE BOX
HISTORY OF PRESENT ILLNESS:     87yM PMH with severe low back pain. Patient admits he has chronic back pain for years. He has seen a  pain management specialist,  Dr Parag Flores from [facility], had several SERGIO 6 years ago, which helped him until recently. He states that around Mother's day he had exacerbation of  back pain, took vicodin & apparently became " loopy " as per family, he was found on the floor,  in pain & dizzy. He visited a orthopedic walk in, was treated with a medrol dose pack & a cortisone injection. He had relief for 1 day. He states the pain is with any movement,  which is limiting his ADL's. He normally walks without assistance, he lives alone however he is requiring a walker.  Denies bladder or bowel incontinence     PAST MEDICAL & SURGICAL HISTORY:  HTN (hypertension)    Hyperlipidemia    Hypothyroidism    Malignant melanoma  left shoulder    Aortic stenosis    Other primary thrombocytopenia    H/O angina pectoris    GERD (gastroesophageal reflux disease)    H/O hernia repair    S/P colon resection  due to diverticulitis    S/P cholecystectomy    History of pseudoaneurysm  s/p repair      FAMILY HISTORY:  FH: ovarian cancer  mother    FH: stomach cancer (Father)  father        SOCIAL HISTORY:  Tobacco Use: denies  EtOH use: unknown  Substance: denies    Allergies    Allergy Status Unknown    Intolerances        REVIEW OF SYSTEMS  Negative except as noted in HPI  CONSTITUTIONAL: No fever, or fatigue  EYES: No eye pain, visual disturbances, or discharge  ENMT:  No difficulty hearing, tinnitus, vertigo; No sinus or throat pain  NECK: No pain or stiffness  RESPIRATORY: No cough, chills or hemoptysis; No shortness of breath  CARDIOVASCULAR: No chest pain, palpitations, dizziness. +++ pitting edema in the left lower extremity.   GASTROINTESTINAL: No abdominal or epigastric pain. No nausea, vomiting.  No diarrhea or constipation.   GENITOURINARY: No dysuria, frequency, hematuria, or incontinence  NEUROLOGICAL: No headaches, memory loss, loss of strength, numbness, or tremors  SKIN: + areas of small skin tears, different stages of ecchymosis, Pt is on Xarelto   MUSCULOSKELETAL: +  joint pain  ++ back pain  HEME/LYMPH: + easily  bruising      HOME MEDICATIONS:  Home Medications:  Aspir 81 oral delayed release tablet: 1 tab(s) orally once a day last dose 8/4/20 (11 Aug 2020 14:02)  atorvastatin 10 mg oral tablet: 1 tab(s) orally once a day PM (11 Aug 2020 14:02)  folic acid: 1  orally once a day (11 Aug 2020 14:02)  levothyroxine 25 mcg (0.025 mg) oral tablet: 1 tab(s) orally once a day AM (11 Aug 2020 14:02)  metoprolol tartrate 25 mg oral tablet: 1 tab(s) orally once a day PM (11 Aug 2020 14:02)  Vitamin B12: 1  orally once a day  (11 Aug 2020 14:02)  Vitamin D3 2000 intl units (50 mcg) oral tablet: 1  orally once a day (11 Aug 2020 14:02)      MEDICATIONS:  Antibiotics:    Neuro:  morphine  - Injectable 4 milliGRAM(s) IV Push every 4 hours PRN    Anticoagulation:  rivaroxaban 15 milliGRAM(s) Oral daily    OTHER:  atorvastatin 10 milliGRAM(s) Oral at bedtime  furosemide    Tablet 40 milliGRAM(s) Oral daily  levothyroxine 75 MICROGram(s) Oral daily  lidocaine   Patch 1 Patch Transdermal Once  metoprolol tartrate 25 milliGRAM(s) Oral daily    IVF:  sodium chloride 0.9% Bolus 500 milliLiter(s) IV Bolus once      Vital Signs Last 24 Hrs  T(C): 36.4 (18 May 2021 11:00), Max: 36.8 (18 May 2021 06:58)  T(F): 97.5 (18 May 2021 11:00), Max: 98.2 (18 May 2021 06:58)  HR: 75 (18 May 2021 11:00) (74 - 75)  BP: 120/77 (18 May 2021 11:00) (120/77 - 145/65)  BP(mean): --  RR: 18 (18 May 2021 11:00) (18 - 18)  SpO2: 95% (18 May 2021 11:00) (95% - 100%)      PHYSICAL EXAM:  GENERAL: NAD, well-developed male who is cooperative for exam   HEAD:  Atraumatic, normocephalic  EYES: Conjunctiva and sclera clear  STEVEN COMA SCORE: E- V- M- = 15       E: 4= opens eyes spontaneously       V: 5= oriented        M: 6= follows commands  MENTAL STATUS: AAO x3; Appropriately conversant without aphasia, following simple commands  CRANIAL NERVES:  EOMI without nystagmus. Face symmetric w/ normal eye closure and smile, tongue midline. Hearing grossly intact. Speech clear. Head turning and shoulder shrug intact.   MOTOR: strength 5/5 b/l upper and lower extremities  Uppers     Delt (C5/6)     Bicep (C5/6)         Tricep (C7)     HG (C8/T1)  R                     5/5                 5/5                5/5                   5/5                     L                      5/5                 5/5                5/5                   5/5                     Lowers      HF(L1/L2)     KE (L3)     DF (L4)     EHL (L5)     PF (S1)      R                     5/5              5/5           5/5           5/5            5/5  L                     5/5               5/5          5/5            5/5            5/5  SENSATION: grossly intact to light touch all extremities  ABDOMEN: Soft, nontender, nondistended  EXTREMITIES: ++ edema      LABS:                        14.2   10.14 )-----------( 133      ( 18 May 2021 09:28 )             43.0     05-18    138  |  104  |  37.0<H>  ----------------------------<  102<H>  5.0   |  28.0  |  0.98    Ca    8.4<L>      18 May 2021 09:28    TPro  4.9<L>  /  Alb  2.8<L>  /  TBili  0.5  /  DBili  x   /  AST  14  /  ALT  14  /  AlkPhos  52  05-18          CULTURES:      RADIOLOGY & ADDITIONAL STUDIES:    MR Lumbar Spine No Cont (05.18.21 @ 12:05)   Lumbar alignment is maintained. Sagittal STIR sequence is markedly limited due to artifact. There is T1 hypointensity, STIR hyperintensity and loss of height in the superior L3 vertebral body suggesting acute fracture. No significant bony retropulsion. Remaining lumbar vertebral body heights are well within normal limits. T1 and STIR hyperintense lesion in the T12 vertebral body, nonspecific but favoring an atypical hemangioma. Multilevel intervertebral disc desiccation.    Evaluation of individual levels demonstrates:    L1-L2: No significant spinal canal or neuroforaminal narrowing.    L2-L3: Bilateral facet arthrosis and ligamentum flavum hypertrophy. Mild spinal canal narrowing. No significant neuroforaminal narrowing.    L3-L4: Bilateral facet arthrosis and ligamentum flavum hypertrophy. No significant spinal canal narrowing. Mild bilateral neuroforaminal narrowing.    L4-L5: Disc bulge. Bilateral facet arthrosis and ligamentum flavum hypertrophy. Mild spinal canal narrowing. Mild to moderate bilateral neuroforaminal narrowing.    L5-S1: No significant spinal canal or neuroforaminal narrowing.    The conus is normal in position and morphology at the L1-L2 level.      IMPRESSION:  Sagittal STIR sequence is markedly limited due to artifact. There is T1 hypointensity, STIR hyperintensity and loss of height in the superior L3 vertebral body suggesting acute fracture. No significant bony retropulsion.      CT Lumbar Spine No Cont (05.18.21 @ 08:55)     New loss of height and cortical irregularity of the superior L3 endplate, suspicious for acute fracture. Remaining lumbar vertebral bodies are within normal limits. Unchanged 6 mm sclerotic lesion in the inferior L4 vertebral body. Lumbar alignment is maintained. Intervertebral disc height loss at L5-S1. Multilevel bilateral facet arthrosis.    There is no prevertebral soft tissue swelling. The paraspinal soft tissues are unremarkable. Cholecystectomy. Prominent abdominal aorta measuring 3 cm. Bladder diverticulum. Anterior abdominal wall surgical clips/mesh.      IMPRESSION: New loss of height and cortical irregularity of the superior L3 endplate, suspicious for acute fracture. Consider MRI lumbar spine if there is no contraindication.          CAPRINI SCORE [CLOT]:  Patient has an estimated Caprini score of greater than 5.  However, the patient's unique clinical situation will be addressed in an individual manner to determine appropriate anticoagulation treatment, if any.

## 2021-05-18 NOTE — ED CDU PROVIDER INITIAL DAY NOTE - FAMILY HISTORY
FH: ovarian cancer, mother     Father  Still living? Unknown  FH: stomach cancer, Age at diagnosis: Age Unknown

## 2021-05-18 NOTE — ED ADULT NURSE REASSESSMENT NOTE - NS ED NURSE REASSESS COMMENT FT1
Pt awake and alert x4, VS afebrile. Pt c/o 7/10 back pain. Medicated as ordered for pain. B/L Lower ext edema noted. IV fluids infusing as ordered. Family at bedside. Will continue to monitor.

## 2021-05-18 NOTE — CONSULT NOTE ADULT - ASSESSMENT
86 y/o male with Chronic low back now with an acute exacerbation of pain for 2 wks, worse over the past several days. CT L Spine & MRI L Spine show an L3 Compression fracture    1. LSO brace was ordered  2. Pain control, avoid over sedative medications, pt is very sensitive with narcotics  3. Bedrest until brace is delivered  4. Once brace is delivered, than may have physical therapy & possibly AR if he qualifies.  88 y/o male with Chronic low back now with an acute exacerbation of pain for 2 wks, worse over the past several days. CT L Spine & MRI L Spine show an L3 Compression fracture    1. LSO brace was ordered  2. Pain control, avoid over sedative medications, pt is very sensitive with narcotics  3. Bedrest until brace is delivered  4. Once brace is delivered, than may have physical therapy & possibly AR if he qualifies.   5. Pts daughter & son are requesting a social work consult for placement once discharged. He lives alone, family took turns staying with hime this wk but they feel he is no longer safe by himself

## 2021-05-18 NOTE — ED PROVIDER NOTE - NS ED ROS FT
Review of Systems  •	CONSTITUTIONAL - no  fever, no diaphoresis, no weight change  •	SKIN - no rash  •	HEMATOLOGIC - no bleeding, no bruising  •	EYES - no eye pain, no blurred vision  •	ENT - no change in hearing, no pain  •	RESPIRATORY - no shortness of breath, no cough  •	CARDIAC - no chest pain, no palpitations  •	GI - no abd pain, no nausea, no vomiting, no diarrhea, no constipation, no bleeding  •	GENITO-URINARY - no discharge, no dysuria; no hematuria,   •	ENDO - no polydipsia, no polyuria, no heat/no cold intolerance  •	MUSCULOSKELETAL - no joint pain, (+)  swelling of b/l legs (+) lower back pain   •	NEUROLOGIC - no weakness, no headache, no anesthesia, no paresthesias  •	PSYCH - no anxiety, non suicidal, non homicidal, no hallucination, no depression

## 2021-05-18 NOTE — ED ADULT TRIAGE NOTE - CHIEF COMPLAINT QUOTE
pt arrived to ER c/o lower back pain and spasms. pt  has chronic back pain but had a fall 1 week ago and has gotten progressively worse since. denies hitting head. on Xarelto. since fall has been walking with walker but now is unable to walk entirely. denies headache, dizziness, nausea, vomiting. aao x4.

## 2021-05-18 NOTE — ED PROVIDER NOTE - CLINICAL SUMMARY MEDICAL DECISION MAKING FREE TEXT BOX
86 yo of M p/w worsening lower back pain x 8 days. also has increased leg swelling secondary to decreased lasix intake due to his lower back pain and unable to walk to bathroom. 86 yo of M p/w worsening lower back pain x 8 days. also has increased leg swelling secondary to decreased lasix intake due to his lower back pain and unable to walk to bathroom. no respiratory distress. lasix 40 mg IV given since patient hasn't had his regular dose. trop 0.01, probnp 1464, cxr didn't show pulmonary edema. duplex leg negative for DVT. CT head negative. CT lumbar showed L3 fracture. xray pelvis stable. neursurgery consulted. will place in Obs.

## 2021-05-18 NOTE — ED CDU PROVIDER INITIAL DAY NOTE - OBJECTIVE STATEMENT
Patient is a 87 year old male with pmhx of HLD, Afib, Hypothyroid, CHF who presents c/o back pain. patient has hx of back pain, has been increasing in severity recently.  Patient was seen at [facility] [place] given prednisone and mobic with no relief.  patient suffered fall 2 weeks ago due to back pain. patient denies any radiation of pain, no weakness, no numbness.  Patient had CT of lumbar spine, found to have L3 fx.  patient left ankle swollen denies pain, doppler negative. patient states has hx of edema to that ankle

## 2021-05-18 NOTE — ED ADULT NURSE NOTE - OBJECTIVE STATEMENT
Pt with chronic back pain comes in c/o increased pain. As per his daughter he was also found on the floor a few days ago. He fell while secondary to the pain while trying to get up off of the toilet. Has edema in b/l lower extremities, denies any SOB. Has non-productive cough.

## 2021-05-18 NOTE — ED ADULT NURSE REASSESSMENT NOTE - NS ED NURSE REASSESS COMMENT FT1
Pt awake and alert x4, no respiratory distress, VSS afebrile. Pt continent uses urinal. Pt c/o 4/10 lower back pain after Morphine.  Repositioned for comfort. Hospital bed provided. Safety maintained. Will continue to monitor. Family at bedside.

## 2021-05-18 NOTE — ED ADULT NURSE NOTE - NSIMPLEMENTINTERV_GEN_ALL_ED
Implemented All Fall with Harm Risk Interventions:  [place] to call system. Call bell, personal items and telephone within reach. Instruct patient to call for assistance. Room bathroom lighting operational. Non-slip footwear when patient is off stretcher. Physically safe environment: no spills, clutter or unnecessary equipment. Stretcher in lowest position, wheels locked, appropriate side rails in place. Provide visual cue, wrist band, yellow gown, etc. Monitor gait and stability. Monitor for mental status changes and reorient to person, place, and time. Review medications for side effects contributing to fall risk. Reinforce activity limits and safety measures with patient and family. Provide visual clues: red socks.

## 2021-05-18 NOTE — ED PROVIDER NOTE - PROGRESS NOTE DETAILS
CT lumbar showed acute fracture of L3. MRI ordered. spoke with neurosurgery for spine. will confirm if patient has prior established relationship with ortho outpatient when patient comes back from ultrasound. patient doesn't follow up with ortho on a regular basis. [facility] I spoke to ortho PA,  doesn't come to this hospital. neurosurgery will see the patient, will place patient in Obs for MR lumbar, neurosx consult, and PT.

## 2021-05-18 NOTE — ED PROVIDER NOTE - PHYSICAL EXAMINATION
VITAL SIGNS: I have reviewed nursing notes and confirm.  CONSTITUTIONAL: Well-developed; well-nourished; in no acute distress.  SKIN: Skin exam is warm and dry, no acute rash.  HEAD: Normocephalic; atraumatic.  EYES: PERRL, EOM intact; conjunctiva and sclera clear.  ENT: No nasal discharge; airway clear. Throat clear.  NECK: Supple; non tender.    CARD: S1, S2 normal; Regular rate and rhythm.  RESP: No wheezes,  no rales or rhonchi.   ABD:  soft; non-distended; non-tender;   Back: (+) diffuse lower back pain   EXT: Normal ROM. No clubbing, cyanosis (+) 2+ pitting edema   NEURO: Alert, oriented. Grossly unremarkable. No focal deficits. no facial droop,

## 2021-05-18 NOTE — ED ADULT NURSE NOTE - CADM POA CENTRAL LINE
unable to obtain information. pt A&O x1 and no family member present. no healthcare proxy form in chart/no
No

## 2021-05-18 NOTE — ED PROVIDER NOTE - OBJECTIVE STATEMENT
88 yo M hx of Afib on xarlto, leg swelling on lasix 40 mg, hypothyoid, GERD, HTN, chronic lower back pain p/w severe lower back pain x 8 days. Daughter report that pain hasn't been moving much since covid. patient lives at home. Patient had PT at home to do some exercerise. the following day patient had worsening pain. He took 1 pill of vicodin. While was getting off the bathroom and fell from the toilet 6 days ago. Patient denies hitting his head. no loc. Since then, patient is now unable to walk. He normally ambulates without assistance and now he need to walk with a walker. He went to walking orthopedics and received an injection and started on medrol dose pack. he is on day 4 of medrol dose pack. Pain not relieved. Of note, daughter report that he doesn't have a hx of CHF. He was given lasix for leg swelling, which was improving. Since patient back pain was worse, unable to reach the bathroom, patient has not been taking his lasix. no chest pain, no sob. no fever. no saddle anesthesia. no urinary incontinence.

## 2021-05-19 LAB
CULTURE RESULTS: SIGNIFICANT CHANGE UP
SPECIMEN SOURCE: SIGNIFICANT CHANGE UP

## 2021-05-19 PROCEDURE — 99282 EMERGENCY DEPT VISIT SF MDM: CPT

## 2021-05-19 PROCEDURE — 99226: CPT

## 2021-05-19 RX ORDER — ACETAMINOPHEN 500 MG
650 TABLET ORAL EVERY 6 HOURS
Refills: 0 | Status: DISCONTINUED | OUTPATIENT
Start: 2021-05-19 | End: 2021-05-22

## 2021-05-19 RX ORDER — OXYCODONE HYDROCHLORIDE 5 MG/1
5 TABLET ORAL ONCE
Refills: 0 | Status: DISCONTINUED | OUTPATIENT
Start: 2021-05-19 | End: 2021-05-19

## 2021-05-19 RX ADMIN — OXYCODONE HYDROCHLORIDE 5 MILLIGRAM(S): 5 TABLET ORAL at 13:12

## 2021-05-19 RX ADMIN — Medication 650 MILLIGRAM(S): at 07:35

## 2021-05-19 RX ADMIN — ATORVASTATIN CALCIUM 10 MILLIGRAM(S): 80 TABLET, FILM COATED ORAL at 21:10

## 2021-05-19 RX ADMIN — Medication 75 MICROGRAM(S): at 05:59

## 2021-05-19 RX ADMIN — Medication 25 MILLIGRAM(S): at 05:59

## 2021-05-19 RX ADMIN — Medication 650 MILLIGRAM(S): at 09:09

## 2021-05-19 RX ADMIN — RIVAROXABAN 15 MILLIGRAM(S): KIT at 11:37

## 2021-05-19 RX ADMIN — Medication 40 MILLIGRAM(S): at 05:59

## 2021-05-19 NOTE — ED ADULT NURSE REASSESSMENT NOTE - MUSCULOSKELETAL ASSISTIVE DEVICES
Diagnosis: Endometrial     Agent/Regimen: [email or web address]    ECO - Fully active, able to carry on all pre-disease activities without restrictions.    Nursing Assessment: A focused nursing assessment addressing the toxicity of oral chemotherapy was performed and the patient reports the following:   Nausea: NO  Vomiting: NO  Fever: NO  Chills: NO  Other signs of infection: NO  Bleeding: NO  Mucositis: NO  Diarrhea: NO  Constipation: YES  Anorexia: NO  Dysuria: NO  Urinary Bleeding: NO  Cough: NO  Shortness of Breath: NO  Fatigue/Weakness: YES  Numbness/Tingling: NO  Other Neuropathies: NO  Edema: NO  Rash: NO  Hand/Foot Syndrome: NO  Anxiety/Depression/Insomnia: NO  Pain: YES, Pain Ratin, Location: headaches, abdominal pain, Intervention: rest      Patient confirms that she has received a copy of Chemotherapy Consent and verbalizes understanding of treatment plan: Yes.    Treatment Plan: - Treatment consent signed  - Patient has valid pre-authorization  - Refer to Toxicity Assessment flowsheet    Adherence: Discussed oral chemo adherence with patient. Patient taking medication as prescribed.        Next appointment scheduled: 20    Patient instructed to call the office with any questions or concerns.    Pt discharged to patient discharged to home per self, ambulatory, with family member     
[email or web address]

## 2021-05-19 NOTE — ED ADULT NURSE REASSESSMENT NOTE - NS ED NURSE REASSESS COMMENT FT1
Assumed care of the patient at 0730. Verbal report received from [facility]. Patient A&Ox4. No s/s of acute distress. Lower back persists with activity only, patient given Tylenol po as per orders. Back brace at the bedside. PIV patent. no neuro deficits noted. Denies numbness or tingling. Voiding in urinal. Patient pending Pt eval and SW consult. Patient in understanding of plan of care. Patient with no further questions for the RN. Resting in comfort. Call bell within reach and encouraged to use when assistance needed. Will continue to monitor. Assumed care of the patient at 0730. Verbal report received from [facility]. Patient A&Ox4. No s/s of acute distress. Lower back persists with activity only, patient given Tylenol po as per orders. Back brace at the bedside. PIV patent. no neuro deficits noted. Denies numbness or tingling. Voiding in urinal. Multiple ecchymotic areas noted to b/l UE. Patient pending Pt eval and SW consult. Patient in understanding of plan of care. Patient with no further questions for the RN. Resting in comfort. Call bell within reach and encouraged to use when assistance needed. Will continue to monitor.

## 2021-05-19 NOTE — PHYSICAL THERAPY INITIAL EVALUATION ADULT - DIAGNOSIS, PT EVAL
Decreased functional mobility secondary to low back pain, anxiety and decreased strength, endurance and balance

## 2021-05-19 NOTE — PHYSICAL THERAPY INITIAL EVALUATION ADULT - ADDITIONAL COMMENTS
Pt reports living alone in a condo with no steps.  Prior to back pain, about a week or 2 ago, pt Modified Independent with all with SAC.

## 2021-05-19 NOTE — ED ADULT NURSE REASSESSMENT NOTE - NS ED NURSE REASSESS COMMENT FT1
Patient pending SW consult for [facility] placement. VSS. T&P. No further questions for the RN. Will continue to monitor.

## 2021-05-19 NOTE — PROGRESS NOTE ADULT - ASSESSMENT
87M w/ PMHX HTN, HLD, hypothyroidism, malignant melanoma, AS, GERD, chronic low back now with an acute exacerbation of pain x 2 weeks, worse over the past several days. CT L Spine & MRI L Spine show an L3 Compression fracture        Plan  - Q4 neuro checks  - LSO bedside, use when OOB  - Pain management   - PT/OT eval; social work d/t living alone  - No acute neurosurgical intervention at this time  - Please follow up with Dr. Macias in 1 month  - D/w Dr. Macias 87M w/ PMHX HTN, HLD, hypothyroidism, malignant melanoma, AS, GERD, chronic low back now with an acute exacerbation of pain x 2 weeks, worse over the past several days.   CT L Spine & MRI L Spine show an L3 Compression fracture        Plan  - Q4 neuro checks  - LSO bedside, use when OOB  - Pain management consult  - PT/OT eval; social work d/t living alone  - No acute neurosurgical intervention at this time  - Medical management/ supportive care per primary team   - Please follow up with Dr. Macias in 2 weeks outpatient, office # [phone]  - D/w Dr. Macias

## 2021-05-19 NOTE — PROGRESS NOTE ADULT - SUBJECTIVE AND OBJECTIVE BOX
INTERVAL HPI/OVERNIGHT EVENTS:  87 year old Male w/ PMHX of HTN, HLD, hypothyroidism, malignant melanoma, AS, GERD, with severe low back pain. Patient admits he has chronic back pain for years. He has seen a pain management specialist,  Dr Parag Flores from [facility], had several SERGIO 6 years ago, which helped him until recently. He states that around Mother's day he had exacerbation of  back pain, took Vicodin & apparently became  "loopy" as per family; found on the floor, in pain/dizzy. He visited a orthopedic walk in, was treated with a medrol dose pack & a cortisone injection w/ relief for 1 day. He states the pain worsened with any movement, which is limiting his ADL's. He normally walks without assistance, he lives alone however he is requiring a walker. Denies bladder or bowel incontinence   Patient seen and examined with neurosurgical team this AM.     Vital Signs Last 24 Hrs  T(C): 36.7 (19 May 2021 08:07), Max: 36.9 (18 May 2021 23:28)  T(F): 98 (19 May 2021 08:07), Max: 98.4 (18 May 2021 23:28)  HR: 82 (19 May 2021 08:07) (64 - 82)  BP: 120/79 (19 May 2021 08:07) (117/70 - 125/84)  BP(mean): 72 (18 May 2021 23:28) (72 - 72)  RR: 17 (19 May 2021 08:07) (16 - 18)  SpO2: 95% (19 May 2021 08:07) (94% - 97%)    PHYSICAL EXAM:  GENERAL: NAD, well-groomed, well-developed  HEAD:  Atraumatic, normocephalic  STEVEN COMA SCORE: E- V- M- =       E: 4= opens eyes spontaneously 3= to voice 2= to noxious 1= no opening       V: 5= oriented 4= confused 3= inappropriate words 2= incomprehensible sounds 1= nonverbal 1T= intubated       M: 6= follows commands 5= localizes 4= withdraws 3= flexor posturing 2= extensor posturing 1= no movement  MENTAL STATUS: AAO x3; Awake/Comatose; Opens eyes spontaneously/to voice/to light touch/to noxious stimuli; Appropriately conversant without aphasia/Nonverbal; following simple commands/mimicking/not following commands  CRANIAL NERVES: Visual acuity normal for age, visual fields full to confrontation, PERRL. EOMI without nystagmus. Facial sensation intact V1-3 distribution b/l. Face symmetric w/ normal eye closure and smile, tongue midline. Hearing grossly intact. Speech clear. Head turning and shoulder shrug intact.   MOTOR: strength 5/5 b/l upper and lower extremities  Uppers     Delt (C5/6)     Bicep (C5/6)     Wrist Extend (C6)     Tricep (C7)     HG (C8/T1)  R                     5/5                 5/5                         5/5                           5/5                   5/5  L                      5/5                 5/5                         5/5                           5/5                   5/5  Lowers      HF(L1/L2)     KE (L3)     DF (L4)     EHL (L5)     PF (S1)      R                     5/5              5/5           5/5           5/5            5/5  L                     5/5               5/5          5/5            5/5            5/5  SENSATION: grossly intact to light touch all extremities  COORDINATION: Gait intact; rapid alternating movements intact; heel to shin intact; no upper extremity dysmetria  CHEST/LUNG: Clear to auscultation bilaterally; no rales, rhonchi, wheezing, or rubs  HEART: +S1/+S2; Regular rate and rhythm; no murmurs, rubs, or gallops  ABDOMEN: Soft, nontender, nondistended; bowel sounds present all four quadrants  EXTREMITIES:  2+ peripheral pulses, no clubbing, cyanosis, or edema  SKIN: Warm, dry; no rashes or lesions      LABS:             14.2   10.14 )-----------( 133      ( 18 May 2021 09:28 )             43.0         138  |  104  |  37.0<H>  ----------------------------<  102<H>  5.0   |  28.0  |  0.98    Ca    8.4<L>      18 May 2021 09:28    TPro  4.9<L>  /  Alb  2.8<L>  /  TBili  0.5  /  DBili  x   /  AST  14  /  ALT  14  /  AlkPhos  52      Urinalysis Basic - ( 18 May 2021 15:16 )    Color: Yellow / Appearance: Clear / S.010 / pH: x  Gluc: x / Ketone: Negative  / Bili: Negative / Urobili: Negative mg/dL   Blood: x / Protein: 100 mg/dL / Nitrite: Negative   Leuk Esterase: Negative / RBC: Negative /HPF / WBC Negative   Sq Epi: x / Non Sq Epi: Negative / Bacteria: Negative          RADIOLOGY & ADDITIONAL TESTS:  MR Lumbar Spine No Cont (21 @ 12:05)   IMPRESSION:  Sagittal STIR sequence is markedly limited due to artifact. There is T1 hypointensity, STIR hyperintensity and loss of height in the superior L3 vertebral body suggesting acute fracture. No significant bony retropulsion.    CT Lumbar Spine No Cont (21 @ 08:55)   IMPRESSION:   New loss of height and cortical irregularity of the superior L3 endplate, suspicious for acute fracture. Consider MRI lumbar spine if there is no contraindication.       INTERVAL HPI/OVERNIGHT EVENTS:  87 year old Male w/ PMHX of HTN, HLD, hypothyroidism, malignant melanoma, AS, GERD, with severe low back pain. Patient admits he has chronic back pain for years. He has seen a pain management specialist,  Dr Parag Flores from [facility], had several SERGIO 6 years ago, which helped him until recently. He states that around Mother's day he had exacerbation of  back pain, took Vicodin & apparently became  "loopy" as per family; found on the floor, in pain/dizzy. He visited a orthopedic walk in, was treated with a medrol dose pack & a cortisone injection w/ relief for 1 day. He states the pain worsened with any movement, which is limiting his ADL's. He normally walks without assistance, he lives alone however he is requiring a walker. Denies bladder or bowel incontinence   Patient seen and examined with neurosurgical team this AM. LEI 5/5 strength, pain worsened w/ movement. LSO brace bedside, discussed plan to wear it whenever OOB.     Vital Signs Last 24 Hrs  T(C): 36.7 (19 May 2021 08:07), Max: 36.9 (18 May 2021 23:28)  T(F): 98 (19 May 2021 08:07), Max: 98.4 (18 May 2021 23:28)  HR: 82 (19 May 2021 08:07) (64 - 82)  BP: 120/79 (19 May 2021 08:07) (117/70 - 125/84)  BP(mean): 72 (18 May 2021 23:28) (72 - 72)  RR: 17 (19 May 2021 08:07) (16 - 18)  SpO2: 95% (19 May 2021 08:07) (94% - 97%)    PHYSICAL EXAM:  GENERAL: NAD, well-groomed, well-developed  HEAD: Atraumatic, normocephalic  STEVEN COMA SCORE: E-4 V-5 M-6 = 15  MENTAL STATUS: AAO x3; Awake; Opens eyes spontaneously; Appropriately conversant without aphasia; following simple commands  CRANIAL NERVES: Visual acuity normal for age, PERRL. EOMI without nystagmus. Facial sensation intact V1-3 distribution b/l. Hearing grossly intact. Speech clear.    MOTOR: strength 5/5 b/l upper and lower extremities; no drift b/l UE. Pain worsened w/ movement  SENSATION: grossly intact to light touch all extremities  CHEST/LUNG: Nonlabored breaths  HEART: +S1/+S2  ABDOMEN: Soft, nontender  EXTREMITIES: b/l LE edema      LABS:             14.2   10.14 )-----------( 133      ( 18 May 2021 09:28 )             43.0         138  |  104  |  37.0<H>  ----------------------------<  102<H>  5.0   |  28.0  |  0.98    Ca    8.4<L>      18 May 2021 09:28    TPro  4.9<L>  /  Alb  2.8<L>  /  TBili  0.5  /  DBili  x   /  AST  14  /  ALT  14  /  AlkPhos  52      Urinalysis Basic - ( 18 May 2021 15:16 )    Color: Yellow / Appearance: Clear / S.010 / pH: x  Gluc: x / Ketone: Negative  / Bili: Negative / Urobili: Negative mg/dL   Blood: x / Protein: 100 mg/dL / Nitrite: Negative   Leuk Esterase: Negative / RBC: Negative /HPF / WBC Negative   Sq Epi: x / Non Sq Epi: Negative / Bacteria: Negative          RADIOLOGY & ADDITIONAL TESTS:  MR Lumbar Spine No Cont (21 @ 12:05)   IMPRESSION:  Sagittal STIR sequence is markedly limited due to artifact. There is T1 hypointensity, STIR hyperintensity and loss of height in the superior L3 vertebral body suggesting acute fracture. No significant bony retropulsion.    CT Lumbar Spine No Cont (21 @ 08:55)   IMPRESSION:   New loss of height and cortical irregularity of the superior L3 endplate, suspicious for acute fracture. Consider MRI lumbar spine if there is no contraindication.

## 2021-05-19 NOTE — PROVIDER CONTACT NOTE (OTHER) - SITUATION
chart reviewed. order received. attempted eval, however pt declined due to feeling "loopy" from the pain medicine. will reattempt as schedule permits. nurse and physician made aware.
PT order received, chart reviewed and contents noted.  PT eval completed and documented.

## 2021-05-19 NOTE — PROVIDER CONTACT NOTE (OTHER) - ACTION/TREATMENT ORDERED:
PT Goals( to achieve in 2 weeks);Pt min. assist with bed mobility. Pt c.g. assist with transfers.  Pt c.g. assist with amb with RW X 50 feet

## 2021-05-19 NOTE — ED ADULT NURSE REASSESSMENT NOTE - NS ED NURSE REASSESS COMMENT FT1
Assumed pt care at this time. Pt resting comfortably in stretcher, A&Ox3, NAD noted, respirations even and nonlabored. Pt offers no complaints at this time. Eye mask and ear plugs given for comfort.

## 2021-05-19 NOTE — PROVIDER CONTACT NOTE (OTHER) - ASSESSMENT
Pt with 8/10 pain before, during and after RX.  Pt will benefit from PT to maximize functional independence.  Will continue to follow.  Pt left supine in bed in no apparent distress and call bell within reach. Nurse aware

## 2021-05-20 VITALS
DIASTOLIC BLOOD PRESSURE: 83 MMHG | TEMPERATURE: 98 F | OXYGEN SATURATION: 98 % | HEART RATE: 77 BPM | SYSTOLIC BLOOD PRESSURE: 144 MMHG | RESPIRATION RATE: 18 BRPM

## 2021-05-20 PROCEDURE — 96376 TX/PRO/DX INJ SAME DRUG ADON: CPT

## 2021-05-20 PROCEDURE — 99284 EMERGENCY DEPT VISIT MOD MDM: CPT | Mod: 25

## 2021-05-20 PROCEDURE — 85027 COMPLETE CBC AUTOMATED: CPT

## 2021-05-20 PROCEDURE — 93970 EXTREMITY STUDY: CPT

## 2021-05-20 PROCEDURE — G0378: CPT

## 2021-05-20 PROCEDURE — 36000 PLACE NEEDLE IN VEIN: CPT

## 2021-05-20 PROCEDURE — 72131 CT LUMBAR SPINE W/O DYE: CPT

## 2021-05-20 PROCEDURE — 71045 X-RAY EXAM CHEST 1 VIEW: CPT

## 2021-05-20 PROCEDURE — 72170 X-RAY EXAM OF PELVIS: CPT

## 2021-05-20 PROCEDURE — 70450 CT HEAD/BRAIN W/O DYE: CPT

## 2021-05-20 PROCEDURE — U0005: CPT

## 2021-05-20 PROCEDURE — 99217: CPT

## 2021-05-20 PROCEDURE — 80053 COMPREHEN METABOLIC PANEL: CPT

## 2021-05-20 PROCEDURE — 84484 ASSAY OF TROPONIN QUANT: CPT

## 2021-05-20 PROCEDURE — 93005 ELECTROCARDIOGRAM TRACING: CPT

## 2021-05-20 PROCEDURE — 96361 HYDRATE IV INFUSION ADD-ON: CPT

## 2021-05-20 PROCEDURE — 96375 TX/PRO/DX INJ NEW DRUG ADDON: CPT

## 2021-05-20 PROCEDURE — 83880 ASSAY OF NATRIURETIC PEPTIDE: CPT

## 2021-05-20 PROCEDURE — 81001 URINALYSIS AUTO W/SCOPE: CPT

## 2021-05-20 PROCEDURE — 87086 URINE CULTURE/COLONY COUNT: CPT

## 2021-05-20 PROCEDURE — 96374 THER/PROPH/DIAG INJ IV PUSH: CPT

## 2021-05-20 PROCEDURE — U0003: CPT

## 2021-05-20 PROCEDURE — 36415 COLL VENOUS BLD VENIPUNCTURE: CPT

## 2021-05-20 PROCEDURE — 72148 MRI LUMBAR SPINE W/O DYE: CPT

## 2021-05-20 PROCEDURE — 73610 X-RAY EXAM OF ANKLE: CPT

## 2021-05-20 RX ADMIN — Medication 75 MICROGRAM(S): at 05:31

## 2021-05-20 RX ADMIN — RIVAROXABAN 15 MILLIGRAM(S): KIT at 15:44

## 2021-05-20 RX ADMIN — Medication 650 MILLIGRAM(S): at 07:20

## 2021-05-20 RX ADMIN — LIDOCAINE 1 PATCH: 4 CREAM TOPICAL at 07:19

## 2021-05-20 RX ADMIN — OXYCODONE HYDROCHLORIDE 5 MILLIGRAM(S): 5 TABLET ORAL at 07:19

## 2021-05-20 RX ADMIN — Medication 25 MILLIGRAM(S): at 05:31

## 2021-05-20 RX ADMIN — Medication 40 MILLIGRAM(S): at 05:31

## 2021-05-20 RX ADMIN — Medication 650 MILLIGRAM(S): at 06:01

## 2021-05-20 NOTE — ED CDU PROVIDER SUBSEQUENT DAY NOTE - PMH
Aortic stenosis    GERD (gastroesophageal reflux disease)    H/O angina pectoris    HTN (hypertension)    Hyperlipidemia    Hypothyroidism    Malignant melanoma  left shoulder  Other primary thrombocytopenia    
Aortic stenosis    GERD (gastroesophageal reflux disease)    H/O angina pectoris    HTN (hypertension)    Hyperlipidemia    Hypothyroidism    Malignant melanoma  left shoulder  Other primary thrombocytopenia

## 2021-05-20 NOTE — ED CDU PROVIDER SUBSEQUENT DAY NOTE - PSH
H/O hernia repair    History of pseudoaneurysm  s/p repair  S/P cholecystectomy    S/P colon resection  due to diverticulitis  
H/O hernia repair    History of pseudoaneurysm  s/p repair  S/P cholecystectomy    S/P colon resection  due to diverticulitis

## 2021-05-20 NOTE — ED ADULT NURSE REASSESSMENT NOTE - GENERAL PATIENT STATE
family/SO at bedside
comfortable appearance/cooperative/family/SO at bedside/resting/sleeping/smiling/interactive
cooperative
cooperative
comfortable appearance/cooperative
comfortable appearance/cooperative/resting/sleeping/smiling/interactive

## 2021-05-20 NOTE — ED CDU PROVIDER SUBSEQUENT DAY NOTE - MEDICAL DECISION MAKING DETAILS
[facility] placement in morning
87 year old male PMHx HLD, A-fib, hypothyroidism, CHF with L3 compression fx. Unable to be assessed by PT yesterday, 2/2 feeling "loopy" from medication. Pending PT/SW.

## 2021-05-20 NOTE — ED CDU PROVIDER DISPOSITION NOTE - CARE PROVIDER_API CALL
Lacey Bailey)  Anesthesiology; Pain Medicine  [address] [place] [address], [place] [address]  Phone: [phone]  Fax: [phone]  Follow Up Time:

## 2021-05-20 NOTE — CHART NOTE - NSCHARTNOTEFT_GEN_A_CORE
Patient Larry Boothe was dispensed a LSO with rigid anterior posterior and lateral panels. Larry and hid family members were educated on the care use and function of the orthosis. Patient and family members were given contact information and informed that if there are any questions or issues pertaining to the orthosis to contact the office. All went without incident.   Reed Rodriguez [facility]  [phone]
SOCIAL WORK NOTE:  [facility] BED BEIBNG HELD FOR THE PATIENT TODAY PENDING AUTHORIZATION FROM INSURANCE.
SOCIAL WORK NOTE:  THIS WORKER SPOKE WITH PATIENT'S DAUGHTER- RONALD INGRID # [phone] TO DISCUSS TRANSITIONAL PLANNING.  DAUGHTER IS ON BOARD WITH [facility] PLACEMENT AND WOULD PREFER [facility] DUE TO CLOSE PROXIMITY TO THEIR HOMES.  RONALD REPORTED SHE WOULD BE SENDING HER SISTER TO PATIENT'S BEDSIDE FOR FURTHER DISCUSSION OF THE PLAN OF AS PATIENT IS SOMEWHAT RESISTANT TO [facility] AND WOULD PREFER TO BE CARED FOR BY FAMILY.  MADE DAUGHTER AWARE PATIENT DID NOT REFUSE AS OF YET.  MARCUS COMPELTED AND CIRCUALTED OUT. [facility] ACCEPTED THE PATIENT FOR [facility] AND [employer] MEDICARE HAS BEEN NOTIFIED OF NEED FOR AUTH.  MADE DAUGHTER AWARE OF THE PROCESS.
SOCIAL WORK NOTE: CCC AND SW MET WITH PATIENT AT THE BEDSIDE.  PATIENT AXOX4. PLEASANT AND COOPERATIVE.  PATIENT VERBALIZED UNDERSTANDING OF THE RECOMMENDATIONS MADE BY PHYSICAL THERAPY.  PATIENT HAS NEVER BEEN TO [facility] BEFORE AND IS UNCERTAIN AT THIS TIME IF HE WOULD LIKE TO GO.  HE PREFERS TO DISCUSS THIS WITH HIS FAMILY. HER REPORTS THAT HIS DAUGHTER WILL BE HERE SHORTLY FOR FURTHER DISCUSSION. THIS WORKER PLACED CALL TO FELICIANO CARDENAS ON  CONFIRMED CELL PHONE # [phone].  NO ANSWER AND UNABLE TO LEAVE MESSAGE AS VOICE MESSAGE FULL.  MARCUS REQUESTED AND WILL BEGIN [facility] PROCESS AS THE PATIENT WILL REQUIRE AUTHORIZATION FOR [facility] LEVEL OF CARE.
SOCIAL WORK NOTE: RECEIVED NOTIFICATION FROM BOTH DAUGHTER NEO AND RONALD THAT THEY WERE CHANGING THE FACILITY TO [facility]. THEY WERE UPSET THAT THEY WERE UNABLE TO TOUR [facility].  MADE DAUGHTER- NEO AWARE THAT WE JUST SUCCESSFULLY CHANGED THE AUTH TO [facility]. DAUGHTER CONFIRMED THAT IT IS DEFINITELY FOR EAST NECK. REACHED OUT TO LOUISE STAPLETON FROM [facility] AND SUCCESSFULLY CHANGED THE AUTHORIZATION TO [facility]. SPOKE WITH YOBANY AT [facility] AND SHE CONFIRMED SHE SPOKE WITH FAMILY AND THEY CAN ACCEPT THE PATIENT TODAY.  PROVIDED THEM WITH AUTH AND AMBULANCE REQUESTED FOR 4;30 PM.  [facility] COMPLETED AND ON DISCHARGE RACK.

## 2021-05-20 NOTE — ED CDU PROVIDER SUBSEQUENT DAY NOTE - NS ED ATTENDING STATEMENT MOD
I have personally performed a face to face diagnostic evaluation on this patient. I have reviewed the ACP note and agree with the history, exam and plan of care, except as noted.
Attending with

## 2021-05-20 NOTE — ED ADULT NURSE REASSESSMENT NOTE - STATUS
Pain MGMT
SW/awaiting consult
awaiting [facility] transfer
PT eval, SW consult/awaiting consult
awaiting transfer to [facility]

## 2021-05-20 NOTE — CHART NOTE - NSCHARTNOTESELECT_GEN_ALL_CORE
SOCIAL WORK NOTE:/Event Note
Event Note
SOCIAL WORK NOTE:/Event Note

## 2021-05-20 NOTE — CONSULT NOTE ADULT - SUBJECTIVE AND OBJECTIVE BOX
CC: back pain    HPI: per chart review:   87yM PMH with severe low back pain. Patient admits he has chronic back pain for years. He has seen a  pain management specialist,  Dr Parag Flores from [facility], had several SERGIO 6 years ago, which helped him until recently. He states that around Mother's day he had exacerbation of  back pain, took vicodin & apparently became " loopy " as per family, he was found on the floor,  in pain & dizzy. He visited a orthopedic walk in, was treated with a medrol dose pack & a cortisone injection. He had relief for 1 day. He states the pain is with any movement,  which is limiting his ADL's. He normally walks without assistance, he lives alone however he is requiring a walker.  Denies bladder or bowel incontinence     Interval Hx:  Patient seen during rounds     < from: MR Lumbar Spine No Cont (21 @ 12:05) >    Lumbar alignment is maintained. Sagittal STIR sequence is markedly limited due to artifact. There is T1 hypointensity, STIR hyperintensity and loss of height in the superior L3 vertebral body suggesting acute fracture. No significant bony retropulsion. Remaining lumbar vertebral body heights are well within normal limits. T1 and STIR hyperintense lesion in the T12 vertebral body, nonspecific but favoring an atypical hemangioma. Multilevel intervertebral disc desiccation.    Evaluation of individual levels demonstrates:    L1-L2: No significant spinal canal or neuroforaminal narrowing.    L2-L3: Bilateral facet arthrosis and ligamentum flavum hypertrophy. Mild spinal canal narrowing. No significant neuroforaminal narrowing.    L3-L4: Bilateral facet arthrosis and ligamentum flavum hypertrophy. No significant spinal canal narrowing. Mild bilateral neuroforaminal narrowing.    L4-L5: Disc bulge. Bilateral facet arthrosis and ligamentum flavum hypertrophy. Mild spinal canal narrowing. Mild to moderate bilateral neuroforaminal narrowing.    L5-S1: No significant spinal canal or neuroforaminal narrowing.    The conus is normal in position and morphology at the L1-L2 level.    < end of copied text >        T(C): 36.4 (21 @ 07:30), Max: 37.1 (21 @ 19:31)  HR: 76 (21 @ 07:30) (76 - 88)  BP: 117/83 (21 @ 07:30) (109/65 - 117/83)  RR: 18 (21 @ 07:30) (18 - 18)  SpO2: 97% (21 @ 07:30) (89% - 97%)        acetaminophen   Tablet .. 650 milliGRAM(s) Oral every 6 hours PRN  atorvastatin 10 milliGRAM(s) Oral at bedtime  furosemide    Tablet 40 milliGRAM(s) Oral daily  levothyroxine 75 MICROGram(s) Oral daily  metoprolol tartrate 25 milliGRAM(s) Oral daily  rivaroxaban 15 milliGRAM(s) Oral daily              Urinalysis Basic - ( 18 May 2021 15:16 )    Color: Yellow / Appearance: Clear / S.010 / pH: x  Gluc: x / Ketone: Negative  / Bili: Negative / Urobili: Negative mg/dL   Blood: x / Protein: 100 mg/dL / Nitrite: Negative   Leuk Esterase: Negative / RBC: Negative /HPF / WBC Negative   Sq Epi: x / Non Sq Epi: Negative / Bacteria: Negative        Pain Service   [phone]   CC: back pain    HPI: per chart review:   87yM PMH with severe low back pain. Patient admits he has chronic back pain for years. He has seen a  pain management specialist,  Dr Parag Flores from [facility], had several SERGIO 6 years ago, which helped him until recently. He states that around Mother's day he had exacerbation of  back pain, took vicodin & apparently became " loopy " as per family, he was found on the floor,  in pain & dizzy. He visited a orthopedic walk in, was treated with a medrol dose pack & a cortisone injection. He had relief for 1 day. He states the pain is with any movement,  which is limiting his ADL's. He normally walks without assistance, he lives alone however he is requiring a walker.  Denies bladder or bowel incontinence     Interval Hx:  Patient seen during rounds   new onset of back pain  mri showing L3 comp fx  patient denies BLE pain/weakness  not a candidate for SERGIO  will benefit from kyphoplasty as outpatient  will require TLSO brace fitting to be able to ambulate with comp fx  denies sedation    < from: MR Lumbar Spine No Cont (21 @ 12:05) >    Lumbar alignment is maintained. Sagittal STIR sequence is markedly limited due to artifact. There is T1 hypointensity, STIR hyperintensity and loss of height in the superior L3 vertebral body suggesting acute fracture. No significant bony retropulsion. Remaining lumbar vertebral body heights are well within normal limits. T1 and STIR hyperintense lesion in the T12 vertebral body, nonspecific but favoring an atypical hemangioma. Multilevel intervertebral disc desiccation.    Evaluation of individual levels demonstrates:    L1-L2: No significant spinal canal or neuroforaminal narrowing.    L2-L3: Bilateral facet arthrosis and ligamentum flavum hypertrophy. Mild spinal canal narrowing. No significant neuroforaminal narrowing.    L3-L4: Bilateral facet arthrosis and ligamentum flavum hypertrophy. No significant spinal canal narrowing. Mild bilateral neuroforaminal narrowing.    L4-L5: Disc bulge. Bilateral facet arthrosis and ligamentum flavum hypertrophy. Mild spinal canal narrowing. Mild to moderate bilateral neuroforaminal narrowing.    L5-S1: No significant spinal canal or neuroforaminal narrowing.    The conus is normal in position and morphology at the L1-L2 level.    < end of copied text >        T(C): 36.4 (21 @ 07:30), Max: 37.1 (21 @ 19:31)  HR: 76 (21 @ 07:30) (76 - 88)  BP: 117/83 (21 @ 07:30) (109/65 - 117/83)  RR: 18 (21 @ 07:30) (18 - 18)  SpO2: 97% (21 @ 07:30) (89% - 97%)        acetaminophen   Tablet .. 650 milliGRAM(s) Oral every 6 hours PRN  atorvastatin 10 milliGRAM(s) Oral at bedtime  furosemide    Tablet 40 milliGRAM(s) Oral daily  levothyroxine 75 MICROGram(s) Oral daily  metoprolol tartrate 25 milliGRAM(s) Oral daily  rivaroxaban 15 milliGRAM(s) Oral daily              Urinalysis Basic - ( 18 May 2021 15:16 )    Color: Yellow / Appearance: Clear / S.010 / pH: x  Gluc: x / Ketone: Negative  / Bili: Negative / Urobili: Negative mg/dL   Blood: x / Protein: 100 mg/dL / Nitrite: Negative   Leuk Esterase: Negative / RBC: Negative /HPF / WBC Negative   Sq Epi: x / Non Sq Epi: Negative / Bacteria: Negative        Pain Service   [phone]

## 2021-05-20 NOTE — ED CDU PROVIDER SUBSEQUENT DAY NOTE - ATTENDING CONTRIBUTION TO CARE
Pt resting comfortably at time of re-assessment. No events overnight. JUSTINA placement pending. Will continue to monitor.
87yoM: with pmh signif for HLD, AFib, Hypothyroidism, CHF; now p/w unsteady gait, fall with L3 compression fx.    -pending pt/sw

## 2021-05-20 NOTE — ED CDU PROVIDER SUBSEQUENT DAY NOTE - PHYSICAL EXAMINATION
Gen: No acute distress, non toxic  HEENT: Mucous membranes moist, pink conjunctivae, EOMI  CV: RRR, nl s1/s2.  Resp: CTAB, normal rate and effort  Neuro: A&O x 3, moving all 4 extremities  MSK: +diffusely ttp across lower back  Skin: No rashes. intact and perfused.

## 2021-05-20 NOTE — ED CDU PROVIDER DISPOSITION NOTE - NSFOLLOWUPINSTRUCTIONS_ED_ALL_ED_FT
- must wear LSO brace with out of bed or ambulating  - activity as tolerated  - Start Tylenol 975mg every 8 hours ATC for pain monitor liver function  -Voltarin gel if available, if not lidoderm 5% patch daily to L3 area  - Follow up with Dr Bailey in 2 weeks for kyphoplasty

## 2021-05-20 NOTE — ED CDU PROVIDER DISPOSITION NOTE - CLINICAL COURSE
Patient is a 87 year old male with pmhx of HLD, Afib, Hypothyroid, CHF who presents c/o back pain. patient has hx of back pain, has been increasing in severity recently.  Patient was seen at ortho  given prednisone and mobic with no relief.  patient suffered fall 2 weeks ago due to back pain. patient denies any radiation of pain, no weakness, no numbness.  Patient had CT of lumbar spine, found to have L3 fx.  patient left ankle swollen denies pain, doppler negative. patient states has hx of edema to that ankle.  Seen by NSx LSO brace obtained, must wear when OOB.  Pain management consulted made medication recommendations, can f/u in 2 weeks for outpatient kyphoplasty.

## 2021-05-20 NOTE — ED CDU PROVIDER DISPOSITION NOTE - PATIENT PORTAL LINK FT
You can access the FollowMyHealth Patient Portal offered by [facility] by registering at the following website: [email or web address]. By joining [employer]’s FollowMyHealth portal, you will also be able to view your health information using other applications (apps) compatible with our system.

## 2021-05-20 NOTE — ED ADULT NURSE REASSESSMENT NOTE - NIH STROKE SCALE: 1A. LEVEL OF CONSCIOUSNESS, QM
(0) Alert; keenly responsive
You can access the FollowMyHealth Patient Portal offered by [facility] by registering at the following website: [email or web address]. By joining [employer]’s FollowMyHealth portal, you will also be able to view your health information using other applications (apps) compatible with our system.

## 2021-05-20 NOTE — ED CDU PROVIDER SUBSEQUENT DAY NOTE - FAMILY HISTORY
FH: ovarian cancer, mother     Father  Still living? Unknown  FH: stomach cancer, Age at diagnosis: Age Unknown  
FH: ovarian cancer, mother     Father  Still living? Unknown  FH: stomach cancer, Age at diagnosis: Age Unknown

## 2021-05-20 NOTE — ED CDU PROVIDER SUBSEQUENT DAY NOTE - HISTORY
Pt resting comfortably at time of re-assessment. No events overnight. JUSTINA placement pending. Will continue to monitor.
No pertinent interval history. Vital signs remained stable overnight. Received no calls by RN overnight.

## 2021-05-20 NOTE — CONSULT NOTE ADULT - ASSESSMENT
87yM PMH with severe low back pain. Patient admits he has chronic back pain for years.  now with L3 comp fx    med recs:  Start acetaminophen 975mg q8 hours standing. HOLD for LFT dysfunction. MAX daily dose = 3 grams as oupt  voltaren gel to back  if unavailable, lidocaine   Patch 12 hours on, 12 hours off. Max 3 patches on at one time \    may require 1 dose of opioid for tlso fitting if he is unable to stand with brace rep    May follow up with us as outpatient for kyphoplasty at:  [facility]  [address], [place], [place] [id number] [phone]  or  [address] [place] [address]; [address]; [place] [address]  [phone]     or can follow up with his pain management practice none

## 2021-05-20 NOTE — ED CDU PROVIDER DISPOSITION NOTE - ATTENDING CONTRIBUTION TO CARE
Patient had CT of lumbar spine, found to have L3 fx.  patient left ankle swollen denies pain, doppler negative. patient states has hx of edema to that ankle.  Seen by NSx LSO brace obtained, must wear when OOB.  Pain management consulted made medication recommendations, can f/u in 2 weeks for outpatient kyphoplasty.

## 2021-05-20 NOTE — ED ADULT NURSE REASSESSMENT NOTE - COMFORT CARE
meal provided/plan of care explained/po fluids offered/wait time explained
plan of care explained/po fluids offered/repositioned/side rails up/warm blanket provided
assisted to bathroom/meal provided/plan of care explained/po fluids offered/wait time explained
meal provided/plan of care explained/po fluids offered/repositioned/wait time explained

## 2021-06-21 ENCOUNTER — OUTPATIENT (OUTPATIENT)
Dept: OUTPATIENT SERVICES | Facility: HOSPITAL | Age: 86
LOS: 1 days | End: 2021-06-21
Payer: MEDICARE

## 2021-06-21 DIAGNOSIS — Z86.79 PERSONAL HISTORY OF OTHER DISEASES OF THE CIRCULATORY SYSTEM: Chronic | ICD-10-CM

## 2021-06-21 DIAGNOSIS — Z90.49 ACQUIRED ABSENCE OF OTHER SPECIFIED PARTS OF DIGESTIVE TRACT: Chronic | ICD-10-CM

## 2021-06-21 DIAGNOSIS — Z98.890 OTHER SPECIFIED POSTPROCEDURAL STATES: Chronic | ICD-10-CM

## 2021-06-21 DIAGNOSIS — R13.10 DYSPHAGIA, UNSPECIFIED: ICD-10-CM

## 2021-06-21 PROCEDURE — 74230 X-RAY XM SWLNG FUNCJ C+: CPT

## 2021-06-21 PROCEDURE — 74230 X-RAY XM SWLNG FUNCJ C+: CPT | Mod: 26

## 2021-06-21 NOTE — SWALLOW VFSS/MBS ASSESSMENT ADULT - SLP PERTINENT HISTORY OF CURRENT PROBLEM
Pt presents today for completion of MBSV. 86 y/o M w/PMH inclusive of GERD. Pt currently taking prescription PPI medication for management of symptoms (globus sensation). Pt reporting frequent "tightness" w/PO & infrequent regurgitation following solids. Has not f/u w/GI recently. Reports past MBSV ~4 y/o, daughter reporting WFL.

## 2021-06-21 NOTE — SWALLOW VFSS/MBS ASSESSMENT ADULT - ROSENBEK'S PENETRATION ASPIRATION SCALE
x1; 1- no aspiration, contrast does not enter airway: for remaining trials/(2) contrast enters airway, remains above the vocal cords, no residue remains (penetration) (1) no aspiration, contrast does not enter airway x 1; 2- contrast enters airway, remains above the cords w/no residue: x 2; 1- no aspiration, contrast does not enter airway, for remainder of trials/(3) contrast remains above the vocal cords, visible residue remains (penetration)

## 2021-06-21 NOTE — SWALLOW VFSS/MBS ASSESSMENT ADULT - RECOMMENDED FEEDING/EATING TECHNIQUES
allow for swallow between intakes/alternate food with liquid/hard swallow w/ each bite or sip/maintain upright posture during/after eating for 30 mins/oral hygiene/position upright (90 degrees)/provide rest periods between swallows/small sips/bites

## 2021-06-21 NOTE — SWALLOW VFSS/MBS ASSESSMENT ADULT - NS SWALLOW VFSS REC ASPIR MON
+REFLUX precautions/change of breathing pattern/oral hygiene/position upright (90Y)/cough/gurgly voice/fever/pneumonia/throat clearing/upper respiratory infection

## 2021-06-21 NOTE — SWALLOW VFSS/MBS ASSESSMENT ADULT - DIAGNOSTIC IMPRESSIONS
Pt presents w/overall functional/age appropriate oropharyngeal swallow. Oral stage noted for mildly prolonged mastication w/resultant increased oral transit of soft solids, given reduced lingual strength/ROM (however overall functional). Premature pharyngeal entry noted to the valleculae w/puree, mech soft, soft solids - and variably between the valleculae/pyriforms w/thin & nectar. No anterior loss or oral stasis visualized.    Pharyngeal phase of swallow noted for intact contractility, w/only trace BOT, valleculae & pyriform sinus residue observed following all consistencies. Pt w/fair-good clearance w/cued subsequent dry swallow. Intact epiglottic deflection, however reduced upper airway protection, as Pt w/penetration of thin liquids, x 3/9 trials, all above the vocal cords. Pt w/complete retrieval of penetration x 2/3 trials (x1 residue remaining). Additional penetration above the cords with clearance observed w/nectar. No aspiration visualized throughout course of study. Pt observed w/intermittent throat clearing throughout, however not associated w/PO.     Esophageal phase of swallow appears WFL, no retention/retrograde progression of PO from cervical esophagus into pharyngeal space was observed. Pt with intermittent report of globus sensation w/solids only.

## 2021-06-21 NOTE — SWALLOW VFSS/MBS ASSESSMENT ADULT - SLP GENERAL OBSERVATIONS
Pt recd awake/upright in wheelchair in radiology, A&A Ox3, daughter present, 0/10 pain pre/post, tolerating RA no overt distress, kyphosis noted

## 2021-06-21 NOTE — SWALLOW VFSS/MBS ASSESSMENT ADULT - ORAL PHASE
within functional limits/Uncontrolled bolus / spillover in santos-pharynx mildly increased oral transit/within functional limits/Delayed oral transit time/Reduced anterior - posterior transport Within functional limits/Uncontrolled bolus / spillover in santos-pharynx/Uncontrolled bolus / spillover in hypopharynx within functional limits/Uncontrolled bolus / spillover in santos-pharynx/Uncontrolled bolus / spillover in hypopharynx

## 2023-10-09 NOTE — ASU PREOP CHECKLIST - SITE MARKED BY SURGEON
Is This A New Presentation, Or A Follow-Up?: Skin Lesions Have Your Skin Lesions Been Treated?: not been treated yes
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
## 2023-12-05 NOTE — PHYSICAL THERAPY INITIAL EVALUATION ADULT - PERTINENT HX OF CURRENT PROBLEM, REHAB EVAL
How Severe Is Your Skin Lesion?: mild Have Your Skin Lesions Been Treated?: not been treated Is This A New Presentation, Or A Follow-Up?: Skin Lesions Which Family Member (Optional)?: Dad L3 compression fx

## 2024-02-06 NOTE — PHYSICAL THERAPY INITIAL EVALUATION ADULT - SITTING BALANCE: DYNAMIC
Pt came in requesting to speak to clinical team in regards to lab results, informed pt he will received a call form clinical team to discuss results.   
good balance

## 2024-11-29 ENCOUNTER — TRANSCRIPTION ENCOUNTER (OUTPATIENT)
Age: 89
End: 2024-11-29

## 2025-02-18 NOTE — H&P PST ADULT - ALLERGIC/IMMUNOLOGIC
Feeling flutters.  Denies cramping or vb.  N/v resolved.  Some difficulty with insomnia - management discussed.  Diet and exercise reviewed.  Anatomy screen US next week.  Taking 81 mg asa.  Return in 4 weeks.   
details…